# Patient Record
Sex: MALE | Race: BLACK OR AFRICAN AMERICAN | NOT HISPANIC OR LATINO | Employment: UNEMPLOYED | URBAN - METROPOLITAN AREA
[De-identification: names, ages, dates, MRNs, and addresses within clinical notes are randomized per-mention and may not be internally consistent; named-entity substitution may affect disease eponyms.]

---

## 2017-02-13 ENCOUNTER — HOSPITAL ENCOUNTER (OUTPATIENT)
Facility: OTHER | Age: 47
Discharge: HOME | End: 2017-02-15
Attending: EMERGENCY MEDICINE | Admitting: EMERGENCY MEDICINE
Payer: MEDICAID

## 2017-02-13 DIAGNOSIS — K92.2 GASTROINTESTINAL HEMORRHAGE, UNSPECIFIED GASTROINTESTINAL HEMORRHAGE TYPE: Primary | ICD-10-CM

## 2017-02-13 DIAGNOSIS — K92.1 MELENA: ICD-10-CM

## 2017-02-13 DIAGNOSIS — R53.1 WEAKNESS: ICD-10-CM

## 2017-02-13 PROBLEM — K92.0 HEMATEMESIS: Status: ACTIVE | Noted: 2017-02-13

## 2017-02-13 PROBLEM — I47.9 PAROXYSMAL TACHYCARDIA: Status: ACTIVE | Noted: 2017-02-13

## 2017-02-13 LAB
ABO + RH BLD: NORMAL
ALBUMIN SERPL BCP-MCNC: 4.8 G/DL
ALP SERPL-CCNC: 73 U/L
ALT SERPL W/O P-5'-P-CCNC: 26 U/L
ANION GAP SERPL CALC-SCNC: 20 MMOL/L
APTT BLDCRRT: 27.4 SEC
AST SERPL-CCNC: 47 U/L
BACTERIA #/AREA URNS HPF: ABNORMAL /HPF
BASOPHILS # BLD AUTO: 0.02 K/UL
BASOPHILS NFR BLD: 0.2 %
BILIRUB SERPL-MCNC: 0.7 MG/DL
BILIRUB UR QL STRIP: ABNORMAL
BLD GP AB SCN CELLS X3 SERPL QL: NORMAL
BUN SERPL-MCNC: 29 MG/DL
CALCIUM SERPL-MCNC: 10.2 MG/DL
CHLORIDE SERPL-SCNC: 97 MMOL/L
CLARITY UR: ABNORMAL
CO2 SERPL-SCNC: 19 MMOL/L
COLOR UR: YELLOW
CREAT SERPL-MCNC: 2 MG/DL
DIFFERENTIAL METHOD: ABNORMAL
EOSINOPHIL # BLD AUTO: 0 K/UL
EOSINOPHIL NFR BLD: 0.2 %
ERYTHROCYTE [DISTWIDTH] IN BLOOD BY AUTOMATED COUNT: 17.4 %
EST. GFR  (AFRICAN AMERICAN): 45 ML/MIN/1.73 M^2
EST. GFR  (NON AFRICAN AMERICAN): 39 ML/MIN/1.73 M^2
GLUCOSE SERPL-MCNC: 112 MG/DL
GLUCOSE UR QL STRIP: NEGATIVE
HCT VFR BLD AUTO: 37.8 %
HGB BLD-MCNC: 12.1 G/DL
HGB UR QL STRIP: ABNORMAL
HYALINE CASTS #/AREA URNS LPF: 29 /LPF
INR PPP: 0.9
KETONES UR QL STRIP: ABNORMAL
LACTATE SERPL-SCNC: 1.6 MMOL/L
LEUKOCYTE ESTERASE UR QL STRIP: ABNORMAL
LIPASE SERPL-CCNC: 62 U/L
LYMPHOCYTES # BLD AUTO: 2.4 K/UL
LYMPHOCYTES NFR BLD: 18.8 %
MCH RBC QN AUTO: 27 PG
MCHC RBC AUTO-ENTMCNC: 32 %
MCV RBC AUTO: 84 FL
MICROSCOPIC COMMENT: ABNORMAL
MONOCYTES # BLD AUTO: 0.6 K/UL
MONOCYTES NFR BLD: 4.8 %
NEUTROPHILS # BLD AUTO: 9.8 K/UL
NEUTROPHILS NFR BLD: 75.7 %
NITRITE UR QL STRIP: NEGATIVE
PH UR STRIP: 6 [PH] (ref 5–8)
PLATELET # BLD AUTO: 273 K/UL
PMV BLD AUTO: 9.3 FL
POTASSIUM SERPL-SCNC: 3.4 MMOL/L
PROT SERPL-MCNC: 9.3 G/DL
PROT UR QL STRIP: ABNORMAL
PROTHROMBIN TIME: 10.5 SEC
RBC # BLD AUTO: 4.48 M/UL
RBC #/AREA URNS HPF: 2 /HPF (ref 0–4)
SODIUM SERPL-SCNC: 136 MMOL/L
SP GR UR STRIP: 1.02 (ref 1–1.03)
SQUAMOUS #/AREA URNS HPF: 3 /HPF
TROPONIN I SERPL DL<=0.01 NG/ML-MCNC: 0.02 NG/ML
URN SPEC COLLECT METH UR: ABNORMAL
UROBILINOGEN UR STRIP-ACNC: 1 EU/DL
WBC # BLD AUTO: 12.95 K/UL
WBC #/AREA URNS HPF: 8 /HPF (ref 0–5)

## 2017-02-13 PROCEDURE — 81000 URINALYSIS NONAUTO W/SCOPE: CPT

## 2017-02-13 PROCEDURE — 99285 EMERGENCY DEPT VISIT HI MDM: CPT | Mod: 25

## 2017-02-13 PROCEDURE — 83690 ASSAY OF LIPASE: CPT

## 2017-02-13 PROCEDURE — G0378 HOSPITAL OBSERVATION PER HR: HCPCS

## 2017-02-13 PROCEDURE — 25000003 PHARM REV CODE 250: Performed by: EMERGENCY MEDICINE

## 2017-02-13 PROCEDURE — 85610 PROTHROMBIN TIME: CPT

## 2017-02-13 PROCEDURE — 96360 HYDRATION IV INFUSION INIT: CPT

## 2017-02-13 PROCEDURE — 93010 ELECTROCARDIOGRAM REPORT: CPT | Mod: ,,, | Performed by: INTERNAL MEDICINE

## 2017-02-13 PROCEDURE — 86900 BLOOD TYPING SEROLOGIC ABO: CPT

## 2017-02-13 PROCEDURE — 80053 COMPREHEN METABOLIC PANEL: CPT

## 2017-02-13 PROCEDURE — 83605 ASSAY OF LACTIC ACID: CPT

## 2017-02-13 PROCEDURE — 84484 ASSAY OF TROPONIN QUANT: CPT

## 2017-02-13 PROCEDURE — 85730 THROMBOPLASTIN TIME PARTIAL: CPT

## 2017-02-13 PROCEDURE — 93005 ELECTROCARDIOGRAM TRACING: CPT

## 2017-02-13 PROCEDURE — 86850 RBC ANTIBODY SCREEN: CPT

## 2017-02-13 PROCEDURE — 99220 PR INITIAL OBSERVATION CARE,LEVL III: CPT | Mod: ,,, | Performed by: HOSPITALIST

## 2017-02-13 PROCEDURE — 96361 HYDRATE IV INFUSION ADD-ON: CPT

## 2017-02-13 PROCEDURE — 85025 COMPLETE CBC W/AUTO DIFF WBC: CPT

## 2017-02-13 RX ORDER — SODIUM CHLORIDE 9 MG/ML
500 INJECTION, SOLUTION INTRAVENOUS
Status: COMPLETED | OUTPATIENT
Start: 2017-02-13 | End: 2017-02-13

## 2017-02-13 RX ORDER — SODIUM CHLORIDE 9 MG/ML
1000 INJECTION, SOLUTION INTRAVENOUS
Status: COMPLETED | OUTPATIENT
Start: 2017-02-13 | End: 2017-02-13

## 2017-02-13 RX ADMIN — SODIUM CHLORIDE 500 ML: 0.9 INJECTION, SOLUTION INTRAVENOUS at 08:02

## 2017-02-13 RX ADMIN — SODIUM CHLORIDE 1000 ML: 0.9 INJECTION, SOLUTION INTRAVENOUS at 10:02

## 2017-02-13 RX ADMIN — SODIUM CHLORIDE 500 ML: 0.9 INJECTION, SOLUTION INTRAVENOUS at 07:02

## 2017-02-13 NOTE — IP AVS SNAPSHOT
[facility] Location ([facility])  [address] [place] [id number]  Phone: [phone]           Patient Discharge Instructions     Our goal is to set you up for success. This packet includes information on your condition, medications, and your home care. It will help you to care for yourself so you don't get sicker and need to go back to the hospital.     Please ask your nurse if you have any questions.        There are many details to remember when preparing to leave the hospital. Here is what you will need to do:    1. Take your medicine. If you are prescribed medications, review your Medication List in the following pages. You may have new medications to  at the pharmacy and others that you'll need to stop taking. Review the instructions for how and when to take your medications. Talk with your doctor or nurses if you are unsure of what to do.     2. Go to your follow-up appointments. Specific follow-up information is listed in the following pages. Your may be contacted by a transition nurse or clinical provider about future appointments. Be sure we have all of the phone numbers to reach you, if needed. Please contact your provider's office if you are unable to make an appointment.     3. Watch for warning signs. Your doctor or nurse will give you detailed warning signs to watch for and when to call for assistance. These instructions may also include educational information about your condition. If you experience any of warning signs to your health, call your doctor.               ** Verify the list of medication(s) below is accurate and up to date. Carry this with you in case of emergency. If your medications have changed, please notify your healthcare provider.             Medication List      START taking these medications        Additional Info                      pantoprazole 40 MG tablet   Commonly known as:  PROTONIX   Quantity:  30 tablet   Refills:  0   Dose:  40 mg     Instructions:  Take 1 tablet (40 mg total) by mouth once daily.     Begin Date    AM    Noon    PM    Bedtime         STOP taking these medications     UNKNOWN TO PATIENT            Where to Get Your Medications      These medications were sent to [facility] [address] [address] [address] AT [facility]  [address], [facility] [address]    Hours:  24-hours Phone:  [phone]     pantoprazole 40 MG tablet                  Please bring to all follow up appointments:    1. A copy of your discharge instructions.  2. All medicines you are currently taking in their original bottles.  3. Identification and insurance card.    Please arrive 15 minutes ahead of scheduled appointment time.    Please call 24 hours in advance if you must reschedule your appointment and/or time.        Follow-up Information     Schedule an appointment as soon as possible for a visit with [facility].    Specialties:  Internal Medicine, Family Medicine    Contact information:    [address]  [facility] [address]  [phone]          Follow up On 3/24/2017.    Why:  @830 AM  MD APPT  WITH VIRY FISCHER        Follow up with Georgette Ty MD. Schedule an appointment as soon as possible for a visit in 2 weeks.    Specialty:  Gastroenterology    Contact information:    [address]  [address]  [facility] [address]  [phone]          Discharge Instructions     Future Orders    Activity as tolerated     Call MD for:  difficulty breathing or increased cough     Call MD for:  increased confusion or weakness     Call MD for:  persistent dizziness, light-headedness, or visual disturbances     Call MD for:  persistent nausea and vomiting or diarrhea     Call MD for:  severe persistent headache     Call MD for:  severe uncontrolled pain     Call MD for:  temperature >100.4     Call MD for:  worsening rash     Diet general     Questions:    Total  "calories:      Fat restriction, if any:      Protein restriction, if any:      Na restriction, if any:      Fluid restriction:      Additional restrictions:          Primary Diagnosis     Your primary diagnosis was:  Vomiting Blood      Admission Information     Date & Time Provider Department CSN    2/13/2017  6:08 PM Michael Pa MD [facility] [id number]      Care Providers     Provider Role Specialty Primary office phone    Michael Pa MD Attending Provider Hospitalist [phone]    Fredy Escobar MD Consulting Physician  Gastroenterology [phone]    Georgette Ty MD Surgeon  Gastroenterology [phone]      Your Vitals Were     BP Pulse Temp Resp Height Weight    138/89 73 98.2 °F (36.8 °C) 16 5' 7" (1.702 m) 63.5 kg (140 lb)    SpO2 BMI             100% 21.93 kg/m2         Recent Lab Values     No lab values to display.      Pending Labs     Order Current Status    Specimen to Pathology - Surgery Collected (02/15/17 0934)      Allergies as of 2/15/2017     No Known Allergies      [facility]     [facility] Nurse Care Line - 24/7 Assistance  Unless otherwise directed by your provider, please contact [facility], our nurse care line that is available for 24/7 assistance.     Registered nurses in the [facility] provide clinical advisement, health education, appointment booking, and other advisory services.  Call for this free service at [phone].        Advance Directives     An advance directive is a document which, in the event you are no longer able to make decisions for yourself, tells your healthcare team what kind of treatment you do or do not want to receive, or who you would like to make those decisions for you.  If you do not currently have an advance directive, [facility] encourages you to create one.  For more information call:  [phone]), [phone] ([phone]),  or log on to [email or web address].      "   Smoking Cessation     If you would like to quit smoking:   You may be eligible for free services if you are a [place] resident and started smoking cigarettes before September 1, 1988.  Call the Smoking Cessation Trust (SCT) toll free at (178) 854-8437 or (088) 375-8185.   Call 1-800-QUIT-NOW if you do not meet the above criteria.            Language Assistance Services     ATTENTION: Language assistance services are available, free of charge. Please call [phone].      ATENCIÓN: Si habla maurilio, tiene a smith disposición servicios gratuitos de asistencia lingüística. Llame al [phone].     CHÚ Ý: N?u b?n nói Ti?ng Vi?t, có các d?ch v? h? tr? ngôn ng? mi?n phí dành cho b?n. G?i s? [phone].        [facility] Sign-Up     Activating your [facility] account is as easy as 1-2-3!     1) Visit [email or web address], select Sign Up Now, enter this activation code and your date of birth, then select Next.  [email or web address]  Expires: 4/1/2017 12:20 PM      2) Create a username and password to use when you visit [facility] in the future and select a security question in case you lose your password and select Next.    3) Enter your e-mail address and click Sign Up!    Additional Information  If you have questions, please e-mail [email or web address] or call [phone] to talk to our [facility] staff. Remember, [facility] is NOT to be used for urgent needs. For medical emergencies, dial 911.          [facility] complies with applicable Federal civil rights laws and does not discriminate on the basis of race, color, national origin, age, disability, or sex.

## 2017-02-14 ENCOUNTER — ANESTHESIA EVENT (OUTPATIENT)
Dept: ENDOSCOPY | Facility: OTHER | Age: 47
End: 2017-02-14
Payer: MEDICAID

## 2017-02-14 ENCOUNTER — ANESTHESIA (OUTPATIENT)
Dept: ENDOSCOPY | Facility: OTHER | Age: 47
End: 2017-02-14
Payer: MEDICAID

## 2017-02-14 ENCOUNTER — SURGERY (OUTPATIENT)
Age: 47
End: 2017-02-14

## 2017-02-14 LAB
ALBUMIN SERPL BCP-MCNC: 3.6 G/DL
ALP SERPL-CCNC: 53 U/L
ALT SERPL W/O P-5'-P-CCNC: 18 U/L
ANION GAP SERPL CALC-SCNC: 19 MMOL/L
AST SERPL-CCNC: 33 U/L
BASOPHILS # BLD AUTO: 0.02 K/UL
BASOPHILS NFR BLD: 0.2 %
BILIRUB SERPL-MCNC: 1.1 MG/DL
BUN SERPL-MCNC: 20 MG/DL
CALCIUM SERPL-MCNC: 8.5 MG/DL
CHLORIDE SERPL-SCNC: 108 MMOL/L
CO2 SERPL-SCNC: 12 MMOL/L
CREAT SERPL-MCNC: 0.9 MG/DL
DIFFERENTIAL METHOD: ABNORMAL
EOSINOPHIL # BLD AUTO: 0.1 K/UL
EOSINOPHIL NFR BLD: 1.2 %
ERYTHROCYTE [DISTWIDTH] IN BLOOD BY AUTOMATED COUNT: 17.7 %
EST. GFR  (AFRICAN AMERICAN): >60 ML/MIN/1.73 M^2
EST. GFR  (NON AFRICAN AMERICAN): >60 ML/MIN/1.73 M^2
GLUCOSE SERPL-MCNC: 85 MG/DL
HCT VFR BLD AUTO: 28.8 %
HCT VFR BLD AUTO: 29 %
HCT VFR BLD AUTO: 29.9 %
HGB BLD-MCNC: 9 G/DL
HGB BLD-MCNC: 9.2 G/DL
HGB BLD-MCNC: 9.5 G/DL
LYMPHOCYTES # BLD AUTO: 2 K/UL
LYMPHOCYTES NFR BLD: 22.7 %
MCH RBC QN AUTO: 26.7 PG
MCHC RBC AUTO-ENTMCNC: 31.7 %
MCV RBC AUTO: 84 FL
MONOCYTES # BLD AUTO: 0.6 K/UL
MONOCYTES NFR BLD: 7.4 %
NEUTROPHILS # BLD AUTO: 5.9 K/UL
NEUTROPHILS NFR BLD: 68.2 %
PLATELET # BLD AUTO: 210 K/UL
PMV BLD AUTO: 9.4 FL
POTASSIUM SERPL-SCNC: 3 MMOL/L
PROT SERPL-MCNC: 6.6 G/DL
RBC # BLD AUTO: 3.44 M/UL
SODIUM SERPL-SCNC: 139 MMOL/L
WBC # BLD AUTO: 8.6 K/UL

## 2017-02-14 PROCEDURE — 25000003 PHARM REV CODE 250: Performed by: INTERNAL MEDICINE

## 2017-02-14 PROCEDURE — 80053 COMPREHEN METABOLIC PANEL: CPT

## 2017-02-14 PROCEDURE — 63600175 PHARM REV CODE 636 W HCPCS: Performed by: INTERNAL MEDICINE

## 2017-02-14 PROCEDURE — G0378 HOSPITAL OBSERVATION PER HR: HCPCS

## 2017-02-14 PROCEDURE — C9113 INJ PANTOPRAZOLE SODIUM, VIA: HCPCS | Performed by: INTERNAL MEDICINE

## 2017-02-14 PROCEDURE — 85014 HEMATOCRIT: CPT | Mod: 91

## 2017-02-14 PROCEDURE — 36415 COLL VENOUS BLD VENIPUNCTURE: CPT

## 2017-02-14 PROCEDURE — 85025 COMPLETE CBC W/AUTO DIFF WBC: CPT

## 2017-02-14 PROCEDURE — 85018 HEMOGLOBIN: CPT

## 2017-02-14 RX ORDER — IBUPROFEN 200 MG
24 TABLET ORAL
Status: DISCONTINUED | OUTPATIENT
Start: 2017-02-14 | End: 2017-02-15 | Stop reason: HOSPADM

## 2017-02-14 RX ORDER — PANTOPRAZOLE SODIUM 40 MG/10ML
80 INJECTION, POWDER, LYOPHILIZED, FOR SOLUTION INTRAVENOUS ONCE
Status: COMPLETED | OUTPATIENT
Start: 2017-02-14 | End: 2017-02-14

## 2017-02-14 RX ORDER — GLUCAGON 1 MG
1 KIT INJECTION
Status: DISCONTINUED | OUTPATIENT
Start: 2017-02-14 | End: 2017-02-15 | Stop reason: HOSPADM

## 2017-02-14 RX ORDER — PANTOPRAZOLE SODIUM 40 MG/10ML
40 INJECTION, POWDER, LYOPHILIZED, FOR SOLUTION INTRAVENOUS 2 TIMES DAILY
Status: DISCONTINUED | OUTPATIENT
Start: 2017-02-14 | End: 2017-02-15 | Stop reason: HOSPADM

## 2017-02-14 RX ORDER — SODIUM CHLORIDE 9 MG/ML
INJECTION, SOLUTION INTRAVENOUS CONTINUOUS
Status: DISCONTINUED | OUTPATIENT
Start: 2017-02-14 | End: 2017-02-15 | Stop reason: HOSPADM

## 2017-02-14 RX ORDER — IBUPROFEN 200 MG
16 TABLET ORAL
Status: DISCONTINUED | OUTPATIENT
Start: 2017-02-14 | End: 2017-02-15 | Stop reason: HOSPADM

## 2017-02-14 RX ORDER — ONDANSETRON 2 MG/ML
4 INJECTION INTRAMUSCULAR; INTRAVENOUS EVERY 12 HOURS PRN
Status: DISCONTINUED | OUTPATIENT
Start: 2017-02-14 | End: 2017-02-15 | Stop reason: HOSPADM

## 2017-02-14 RX ADMIN — SODIUM CHLORIDE: 0.9 INJECTION, SOLUTION INTRAVENOUS at 09:02

## 2017-02-14 RX ADMIN — PANTOPRAZOLE SODIUM 40 MG: 40 INJECTION, POWDER, FOR SOLUTION INTRAVENOUS at 08:02

## 2017-02-14 RX ADMIN — SODIUM CHLORIDE: 0.9 INJECTION, SOLUTION INTRAVENOUS at 01:02

## 2017-02-14 RX ADMIN — SODIUM CHLORIDE: 0.9 INJECTION, SOLUTION INTRAVENOUS at 08:02

## 2017-02-14 RX ADMIN — PANTOPRAZOLE SODIUM 40 MG: 40 INJECTION, POWDER, FOR SOLUTION INTRAVENOUS at 09:02

## 2017-02-14 RX ADMIN — PANTOPRAZOLE SODIUM 80 MG: 40 INJECTION, POWDER, FOR SOLUTION INTRAVENOUS at 01:02

## 2017-02-14 NOTE — ANESTHESIA PREPROCEDURE EVALUATION
02/14/2017  Robert Mcfarland is a 46 y.o., male.    [facility] Anesthesia Evaluation    I have reviewed the Patient Summary Reports.    I have reviewed the Nursing Notes.   I have reviewed the Medications.     Review of Systems  Anesthesia Hx:  Denies Family Hx of Anesthesia complications.   Denies Personal Hx of Anesthesia complications.   Social:  Alcohol Use, Smoker    Hematology/Oncology:  Hematology Normal   Oncology Normal     EENT/Dental:EENT/Dental Normal   Cardiovascular:   Hypertension ECG has been reviewed. EKG: sinus tach   Pulmonary:  Pulmonary Normal    Renal/:  Renal/ Normal     Hepatic/GI:  Hepatic/GI Normal Pt admitted yesterday with probable UGI bleed, melena, single episode coffee ground emesis, also yesterday. In ED, volume depleted, Hct following fluid resuscitation 29. Initially, ARF, now corrected. K 3.0 today.   Musculoskeletal:  Musculoskeletal Normal    Neurological:  Neurology Normal    Endocrine:  Endocrine Normal    Dermatological:  Skin Normal    Psych:   ETOH dependence         Physical Exam  General:  Well nourished    Airway/Jaw/Neck:  Airway Findings: Mouth Opening: Normal Tongue: Normal  General Airway Assessment: Adult  Mallampati: II  TM Distance: Normal, at least 6 cm      Dental:  Dental Findings: Periodontal disease, Severe, upper front caps         Mental Status:  Mental Status Findings:  Alert and Oriented, Cooperative         Anesthesia Plan  Type of Anesthesia, risks & benefits discussed:  Anesthesia Type:  general  Patient's Preference:   Intra-op Monitoring Plan:   Intra-op Monitoring Plan Comments:   Post Op Pain Control Plan:   Post Op Pain Control Plan Comments:   Induction:   IV  Beta Blocker:         Informed Consent: Patient understands risks and agrees with Anesthesia plan.  Questions answered. Anesthesia consent signed with patient.  ASA Score: 2     Day of  Surgery Review of History & Physical:    H&P update referred to the surgeon.         Ready For Surgery From Anesthesia Perspective.

## 2017-02-14 NOTE — ED NOTES
Patient resting quietly on stretcher in NAD, with family at bedside. IV infusing and patient denies pain/complaints at this time. Pt on cardiac monitor, NIBP, and pulse ox. Will continue to monitor.

## 2017-02-14 NOTE — ASSESSMENT & PLAN NOTE
- suspect chronic UGIB secondary to likely PUD or erosive gastropathy.    - history of frequent NSAID use  - no history of cirrhosis  - IVF  - will monitor H/H  - 80 mg Protonix IV bolus  - 40 mg Protonix IV BID starting tomorrow  - NPO  - GI consult for EGD

## 2017-02-14 NOTE — PLAN OF CARE
Attn: team   DC PLANNING     PT NEEDS PCP - RN GURINDER  MADE F/U APPT @ [facility] ( PT CHOICE )     OUTPT COMMUNITY REFERRALS GIVEN ON COMMUNITY CLINICS AND  FREE PHARMACY ALSO ON OUT ALCOHOL REHAB     Avril Mejía RN  Case management 2/14/201712:44 PM  # [phone] (FAX) [phone]     02/14/17 1306   Discharge Assessment   Assessment Type Discharge Planning Assessment   Confirmed/corrected address and phone number on facesheet? Yes   Assessment information obtained from? Patient   Prior to hospitilization cognitive status: Alert/Oriented   Prior to hospitalization functional status: Independent   Current cognitive status: Alert/Oriented   Current Functional Status: Independent   Able to Return to Prior Arrangements yes   Is patient able to care for self after discharge? Yes   Patient currently being followed by outpatient case management? No   Patient currently receives home health services? No   Does the patient currently use HME? No   Patient currently receives private duty nursing? N/A   Patient currently receives any other outside agency services? No   Equipment Currently Used at Home none   Do you have any problems affording any of your prescribed medications? TBD   Is the patient taking medications as prescribed? yes   Do you have any financial concerns preventing you from receiving the healthcare you need? No   Does the patient have transportation to healthcare appointments? No   On Dialysis? No   Does the patient receive services at the Coumadin Clinic? No   Are there any open cases? No   Discharge Plan A Home with family   Discharge Plan B Home with family   Patient/Family In Agreement With Plan yes

## 2017-02-14 NOTE — NURSING
Bed alarm alarming; pt noted by staff that pt was noted with cake in his hand; staff noted piece of cake was missing. Pt due for a EGD; spoke with Dr. Ty was notified; Dr. Pa paged. Pt was instructed upon assessment to remain NPO.   1001: new orders per Dr. Pa: clear liquids and NPO after MN.

## 2017-02-14 NOTE — ED PROVIDER NOTES
"Encounter Date: 2/13/2017    SCRIBE #1 NOTE: I, Kay Mian, am scribing for, and in the presence of, Dr. House.       History     Chief Complaint   Patient presents with    Weakness     x 1week, states vomited blood yesterday      Review of patient's allergies indicates:  No Known Allergies  HPI Comments: Time seen by provider: 7:00 PM    This is a 46 y.o. male who presents with complaint of weakness that has persisted for approximately 1 week.  The patient also endorses diaphoresis, as well as 1 episode of hematemesis which occurred yesterday.  He describes the blood as "dark red" and "black" in color.  He has also had dark stools for the past several weeks now.  He mentions that he had abdominal pain, which he describes as a "cramping," 2 days ago, which has since resolved.  He reports no fever, chills, congestion, sore throat, CP, dysuria, changes in urinary frequency or urgency, difficulty urinating, or hematuria.  The patient admits that he takes approximately 2 ibuprofen daily.  He reports no major medical problems or daily medications.  He mentions that he drinks approximately 2-3 beers daily.    The history is provided by the patient.     Past Medical History   Diagnosis Date    Alcohol abuse      No past medical history pertinent negatives.  No past surgical history on file.  No family history on file.  Social History   Substance Use Topics    Smoking status: Current Every Day Smoker     Types: Cigarettes    Smokeless tobacco: Not on file    Alcohol use Yes     Review of Systems   Constitutional: Negative for chills and fever.   HENT: Negative for congestion and facial swelling.    Respiratory: Negative for chest tightness and shortness of breath.    Cardiovascular: Negative for chest pain.   Gastrointestinal: Positive for blood in stool and vomiting. Abdominal pain: Resolved.        Positive for hematemesis.   Endocrine: Negative for polyuria.   Genitourinary: Negative for difficulty urinating, " dysuria, frequency, hematuria and urgency.   Musculoskeletal: Negative for myalgias.   Skin: Negative for rash.   Neurological: Positive for weakness. Negative for headaches.       Physical Exam   Initial Vitals   BP Pulse Resp Temp SpO2   02/13/17 1813 02/13/17 1813 02/13/17 1813 02/13/17 1813 02/13/17 1813   110/83 127 16 98.9 °F (37.2 °C) 100 %     Physical Exam    Nursing note and vitals reviewed.  Constitutional: He appears well-developed and well-nourished. He is not diaphoretic. No distress.   HENT:   Head: Normocephalic and atraumatic.   Right Ear: External ear normal.   Left Ear: External ear normal.   Dry mucous membranes   Eyes: EOM are normal. Right eye exhibits no discharge. Left eye exhibits no discharge.   Neck: Normal range of motion. Neck supple.   Cardiovascular: Regular rhythm and normal heart sounds.   Tachycardic   Pulmonary/Chest: Breath sounds normal. No respiratory distress. He has no wheezes. He has no rhonchi. He has no rales.   Abdominal: Soft. Bowel sounds are normal. He exhibits no distension. There is no tenderness. There is no rebound and no guarding.   Genitourinary: Rectal exam shows external hemorrhoid and guaiac positive stool. Rectal exam shows anal tone normal. Guaiac positive stool. : Acceptable.  Genitourinary Comments: Enlarged, non-thrombosed external hemorrhoid.  Faint gross blood and guaiac positive.  Good rectal tone.     Musculoskeletal: Normal range of motion. He exhibits no edema or tenderness.   Neurological: He is alert and oriented to person, place, and time.   Skin: Skin is warm and dry. No rash and no abscess noted. No erythema. No pallor.   Psychiatric: He has a normal mood and affect. His behavior is normal. Judgment and thought content normal.         ED Course   Procedures  Labs Reviewed   COMPREHENSIVE METABOLIC PANEL - Abnormal; Notable for the following:        Result Value    Potassium 3.4 (*)     CO2 19 (*)     Glucose 112 (*)     BUN, Bld  29 (*)     Creatinine 2.0 (*)     Total Protein 9.3 (*)     AST 47 (*)     Anion Gap 20 (*)     eGFR if  45 (*)     eGFR if non  39 (*)     All other components within normal limits   CBC W/ AUTO DIFFERENTIAL - Abnormal; Notable for the following:     WBC 12.95 (*)     RBC 4.48 (*)     Hemoglobin 12.1 (*)     Hematocrit 37.8 (*)     RDW 17.4 (*)     Gran # 9.8 (*)     Gran% 75.7 (*)     All other components within normal limits   URINALYSIS - Abnormal; Notable for the following:     Appearance, UA Hazy (*)     Protein, UA 1+ (*)     Ketones, UA 3+ (*)     Bilirubin (UA) 2+ (*)     Occult Blood UA Trace (*)     Leukocytes, UA Trace (*)     All other components within normal limits   LIPASE - Abnormal; Notable for the following:     Lipase 62 (*)     All other components within normal limits   URINALYSIS MICROSCOPIC - Abnormal; Notable for the following:     WBC, UA 8 (*)     Hyaline Casts, UA 29 (*)     All other components within normal limits   LACTIC ACID, PLASMA   TROPONIN I   APTT   PROTIME-INR   TYPE & SCREEN      Imaging Results         X-Ray Chest 1 View (Final result) Result time:  02/13/17 19:48:57    Final result by Vern Brian MD (02/13/17 19:48:57)    Impression:        No radiographic acute intrathoracic process seen on this single view.      Electronically signed by: VERN BRIAN MD, MD  Date:     02/13/17  Time:    19:48     Narrative:    COMPARISON: None    FINDINGS: AP portable view of the chest. Monitoring leads overlie the chest. Pulmonary vasculature and hilar regions are within normal limits. The bilateral lungs are well expanded and clear.  No large pleural effusion or pneumothorax.  The heart and mediastinal contours are within normal limits for age.  Osseous structures appear intact.              EKG Readings: (Independently Interpreted)   Initial Reading: No STEMI.   19:25. Rate of 114. Sinus tachycardia. Normal axis. Normal intervals. No ST or ischemic  changes.         X-Rays:   Independently Interpreted Readings:   Chest X-Ray: X-Ray Chest 1 View: Trachea midline. No cardiomegaly. No effusion, infiltrate, or edema.       Medical Decision Making:   History:   Old Medical Records: I decided to obtain old medical records.  Old Records Summarized: records from previous admission(s) and other records.  Initial Assessment:   7:00PM:  Pt is a 47 y/o M who presents to ED with generalized weakness and GIB.  Pt does admit to NSAID and ETOH use.  He is tachycardic though his BP is stable. Will plan for labs, IVFs, will continue to follow and reassess.    Independently Interpreted Test(s):   I have ordered and independently interpreted EKG Reading(s) - see prior notes  Clinical Tests:   Lab Tests: Ordered and Reviewed  Radiological Study: Ordered and Reviewed  Medical Tests: Ordered and Reviewed    10:16 PM:  Pt doing well, he remains stable, though still tachycardic after 1L.  Will plan for an additional liter.  He does have a Cr of 2.0 with no previous baseline known.  His Hgb is 12, though he may have some level of hemoconcentration as well. Given his persistent tachycardic and GIB, will plan to admit for further observation and management.  I updated pt regarding results and all questions answered. Pt agreeable to plan.      10:19 PM:  I spoke with Dr. Avila regarding pt, he is agreeable to plan for admission and all questions answered.              Scribe Attestation:   Scribe #1: I performed the above scribed service and the documentation accurately describes the services I performed. I attest to the accuracy of the note.    Attending Attestation:           Physician Attestation for Scribe:  Physician Attestation Statement for Scribe #1: I, Dr. House, reviewed documentation, as scribed by Kay Pappas in my presence, and it is both accurate and complete.                 ED Course     Clinical Impression:     1. Gastrointestinal hemorrhage, unspecified gastrointestinal  hemorrhage type    2. Weakness                Nereida House MD  02/13/17 0744

## 2017-02-14 NOTE — H&P
[facility] Hospital Medicine  History & Physical    Patient Name: Robert Mcfarland  MRN: [id number]  Admission Date: 2/13/2017  Attending Physician: Nereida House MD   Primary Care Provider: Primary Doctor No         Patient information was obtained from patient and ER records.     Subjective:     Principal Problem:<principal problem not specified>    Chief Complaint:   Chief Complaint   Patient presents with    Weakness     x 1week, states vomited blood yesterday         HPI: This is a 46 year old male with no significant past medical history who presents with one week of dark stools associated with progressive weakness and occasional shortness of breath.  He states that yesterday he also had one episode of coffee ground emesis.  All of this prompted him to come to the ED where he was found to be tachycardic, clinically volume deplete, and with an H/H of 12/37.  He endorses one year of NSAID use, taking 800 mg of Ibuprofen at least every other day.  He also endorses ETOH use around three times a week, but denies any history of liver disease.  Otherwise, he has no other complaints today.    Past Medical History   Diagnosis Date    Alcohol abuse        No past surgical history on file.    Review of patient's allergies indicates:  No Known Allergies    No current facility-administered medications on file prior to encounter.      No current outpatient prescriptions on file prior to encounter.     Family History     None        Social History Main Topics    Smoking status: Current Every Day Smoker     Types: Cigarettes    Smokeless tobacco: Not on file    Alcohol use Yes    Drug use: No    Sexual activity: Not on file     Review of Systems   Constitutional: Positive for fatigue. Negative for activity change, appetite change and fever.   HENT: Negative.    Eyes: Positive for redness.   Respiratory: Negative for chest tightness, shortness of breath and wheezing.    Cardiovascular: Negative for chest  pain, palpitations and leg swelling.   Gastrointestinal: Positive for blood in stool and vomiting. Negative for abdominal distention, abdominal pain and diarrhea.   Genitourinary: Negative for dysuria and hematuria.   Neurological: Negative for headaches.   Hematological: Negative for adenopathy.   Psychiatric/Behavioral: Negative for confusion.     Objective:     Vital Signs (Most Recent):  Temp: 98.9 °F (37.2 °C) (02/13/17 1813)  Pulse: (!) 114 (02/13/17 2036)  Resp: 16 (02/13/17 1813)  BP: (!) 140/90 (02/13/17 2036)  SpO2: 100 % (02/13/17 2036) Vital Signs (24h Range):  Temp:  [98.9 °F (37.2 °C)] 98.9 °F (37.2 °C)  Pulse:  [114-127] 114  Resp:  [16] 16  SpO2:  [100 %] 100 %  BP: (110-140)/(83-90) 140/90     Weight: 63.5 kg (140 lb)  Body mass index is 21.93 kg/(m^2).    Physical Exam   Constitutional: He is oriented to person, place, and time. He appears well-developed and well-nourished. No distress.   HENT:   Head: Normocephalic and atraumatic.   Eyes: Pupils are equal, round, and reactive to light.   Neck: Neck supple. No thyromegaly present.   Cardiovascular: Normal rate.  Exam reveals no gallop and no friction rub.    No murmur heard.  tachycardic   Pulmonary/Chest: Effort normal and breath sounds normal. No respiratory distress. He has no wheezes.   Abdominal: Soft. Bowel sounds are normal. He exhibits no distension. There is no tenderness. There is no guarding.   Musculoskeletal: Normal range of motion. He exhibits no edema.   Neurological: He is alert and oriented to person, place, and time.   Skin: Skin is warm and dry. No erythema.   Psychiatric: He has a normal mood and affect.        Significant Labs:   CBC:   Recent Labs  Lab 02/13/17 1917   WBC 12.95*   HGB 12.1*   HCT 37.8*          Significant Imaging: I have reviewed and interpreted all pertinent imaging results/findings within the past 24 hours.    Assessment/Plan:     Melena  - suspect chronic UGIB secondary to likely PUD or erosive  gastropathy.    - history of frequent NSAID use  - no history of cirrhosis  - IVF  - will monitor H/H  - 80 mg Protonix IV bolus  - 40 mg Protonix IV BID starting tomorrow  - NPO  - GI consult for EGD      Hematemesis  - as above  - no risk factors for variceal bleed      Paroxysmal tachycardia  - secondary to volume depletion  - monitor after volume given    VTE prophylaxis: SCD's    Vega Avila MD  Department of Hospital Medicine   [facility]

## 2017-02-14 NOTE — NURSING
Pt resting quietly in bed with family at bedside. Denies pain. C/o SOB with activity. VSS, 100% on RA. NS @ 125 infusing to LAC PIV per order. NPO status maintained.Repositions in bed independently. Urinal at bedside. Instructed to call for assistance as needed. Purposeful rounding done. Safety maintained. No acute distress noted at this time. Will continue to monitor.

## 2017-02-14 NOTE — NURSING
Security called multiple times due to pt significant other noted yelling and using profanity. Pt in NAD. Pt updated on plan of care.

## 2017-02-14 NOTE — CONSULTS
Consult Note  Gastroenterology    Consult Requested By: Dr Pa  Reason for Consult: n/v    SUBJECTIVE:     History of Present Illness:  Patient is a 46 y.o. male who presents with n/v associated with blood. Onset of symptoms was abrupt starting 2 day ago with stable course since that time. Patient denies constipation, diarrhea and dysphagia. Symptoms are aggravated by none. Symptoms improve with none. Past history includes ETOH use. Pt denies any prior h/o pud or hepatitis    Review of patient's allergies indicates:  No Known Allergies    Scheduled Meds:   pantoprazole  40 mg Intravenous BID       Continuous Infusions:   sodium chloride 0.9% 125 mL/hr at 02/14/17 0845       PRN Meds:.  dextrose 50%, dextrose 50%, glucagon (human recombinant), glucose, glucose, ondansetron, promethazine (PHENERGAN) IVPB    Past Medical History   Diagnosis Date    Alcohol abuse     Hypertension        History reviewed. No pertinent past surgical history.    History reviewed. No pertinent family history.    Social History     Social History    Marital status: Single     Spouse name: N/A    Number of children: N/A    Years of education: N/A     Social History Main Topics    Smoking status: Current Every Day Smoker     Types: Cigarettes    Smokeless tobacco: None    Alcohol use Yes    Drug use: No    Sexual activity: Not Asked     Other Topics Concern    None     Social History Narrative       Review of Systems:  Constitutional: no fever or chills  Eyes: no visual changes  ENT: no nasal congestion or sore throat  Respiratory: no cough or shorness of breath  Cardiovascular: no chest pain or palpitations  Gastrointestinal: no nausea or vomiting, no abdominal pain or change in bowel habits  Genitourinary: no hematuria or dysuria    OBJECTIVE:   Physical Exam:  General: Well developed, well nourished, no apparent distress  Vital Signs Range (Last 24H):  Temp:  [98.1 °F (36.7 °C)-98.9 °F (37.2 °C)]   Pulse:  [102-127]    Resp:  [16-18]   BP: (110-144)/(65-97)   SpO2:  [100 %]   HEENT: Anicteric, PERRLA  CVS: S1, S2, no murmers/rubs  Lungs: CTA-B, normal excursion  Abdomen: Soft, NT, ND, normal BS's  Extremities: No c/c/e, 1+ DP pulses to BLE's  Skin: No rashes/lesions.      Laboratory:    CBC:   Recent Labs  Lab 02/14/17  0504   WBC 8.60   RBC 3.44*   HGB 9.2*   HCT 29.0*      MCV 84   MCH 26.7*   MCHC 31.7*     CMP:   Recent Labs  Lab 02/14/17  0504   GLU 85   CALCIUM 8.5*   ALBUMIN 3.6   PROT 6.6      K 3.0*   CO2 12*      BUN 20   CREATININE 0.9   ALKPHOS 53*   ALT 18   AST 33   BILITOT 1.1*     Coagulation:   Recent Labs  Lab 02/13/17 1917   INR 0.9   APTT 27.4       Recent Results (from the past 336 hour(s))   CBC with Automated Differential    Collection Time: 02/14/17  5:04 AM   Result Value Ref Range    WBC 8.60 3.90 - 12.70 K/uL    Hemoglobin 9.2 (L) 14.0 - 18.0 g/dL    Hematocrit 29.0 (L) 40.0 - 54.0 %    Platelets 210 150 - 350 K/uL   CBC auto differential    Collection Time: 02/13/17  7:17 PM   Result Value Ref Range    WBC 12.95 (H) 3.90 - 12.70 K/uL    Hemoglobin 12.1 (L) 14.0 - 18.0 g/dL    Hematocrit 37.8 (L) 40.0 - 54.0 %    Platelets 273 150 - 350 K/uL        Recent Labs  Lab 02/13/17 1917   APTT 27.4   INR 0.9       Recent Labs  Lab 02/13/17 1917 02/14/17  0504    139   K 3.4* 3.0*   CL 97 108   CO2 19* 12*   BUN 29* 20   CREATININE 2.0* 0.9   CALCIUM 10.2 8.5*   PROT 9.3* 6.6   BILITOT 0.7 1.1*   ALKPHOS 73 53*   ALT 26 18   AST 47* 33    No results found for: HAV, HEPAIGM, HEPBIGM, HEPBCAB, HBEAG, HEPCAB No results found for: AFP   No results found for: CEA       Diagnostic Results:  No Further    ASSESSMENT/PLAN:     1. Gastrointestinal hemorrhage, unspecified gastrointestinal hemorrhage type    2. Weakness        Plan: 1) PPI BID  2) serial h/h  3) EGD

## 2017-02-14 NOTE — SUBJECTIVE & OBJECTIVE
Past Medical History   Diagnosis Date    Alcohol abuse        No past surgical history on file.    Review of patient's allergies indicates:  No Known Allergies    No current facility-administered medications on file prior to encounter.      No current outpatient prescriptions on file prior to encounter.     Family History     None        Social History Main Topics    Smoking status: Current Every Day Smoker     Types: Cigarettes    Smokeless tobacco: Not on file    Alcohol use Yes    Drug use: No    Sexual activity: Not on file     Review of Systems   Constitutional: Positive for fatigue. Negative for activity change, appetite change and fever.   HENT: Negative.    Eyes: Positive for redness.   Respiratory: Negative for chest tightness, shortness of breath and wheezing.    Cardiovascular: Negative for chest pain, palpitations and leg swelling.   Gastrointestinal: Positive for blood in stool and vomiting. Negative for abdominal distention, abdominal pain and diarrhea.   Genitourinary: Negative for dysuria and hematuria.   Neurological: Negative for headaches.   Hematological: Negative for adenopathy.   Psychiatric/Behavioral: Negative for confusion.     Objective:     Vital Signs (Most Recent):  Temp: 98.9 °F (37.2 °C) (02/13/17 1813)  Pulse: (!) 114 (02/13/17 2036)  Resp: 16 (02/13/17 1813)  BP: (!) 140/90 (02/13/17 2036)  SpO2: 100 % (02/13/17 2036) Vital Signs (24h Range):  Temp:  [98.9 °F (37.2 °C)] 98.9 °F (37.2 °C)  Pulse:  [114-127] 114  Resp:  [16] 16  SpO2:  [100 %] 100 %  BP: (110-140)/(83-90) 140/90     Weight: 63.5 kg (140 lb)  Body mass index is 21.93 kg/(m^2).    Physical Exam   Constitutional: He is oriented to person, place, and time. He appears well-developed and well-nourished. No distress.   HENT:   Head: Normocephalic and atraumatic.   Eyes: Pupils are equal, round, and reactive to light.   Neck: Neck supple. No thyromegaly present.   Cardiovascular: Normal rate.  Exam reveals no gallop and  no friction rub.    No murmur heard.  tachycardic   Pulmonary/Chest: Effort normal and breath sounds normal. No respiratory distress. He has no wheezes.   Abdominal: Soft. Bowel sounds are normal. He exhibits no distension. There is no tenderness. There is no guarding.   Musculoskeletal: Normal range of motion. He exhibits no edema.   Neurological: He is alert and oriented to person, place, and time.   Skin: Skin is warm and dry. No erythema.   Psychiatric: He has a normal mood and affect.        Significant Labs:   CBC:   Recent Labs  Lab 02/13/17 1917   WBC 12.95*   HGB 12.1*   HCT 37.8*          Significant Imaging: I have reviewed and interpreted all pertinent imaging results/findings within the past 24 hours.

## 2017-02-15 ENCOUNTER — SURGERY (OUTPATIENT)
Age: 47
End: 2017-02-15

## 2017-02-15 VITALS
DIASTOLIC BLOOD PRESSURE: 89 MMHG | RESPIRATION RATE: 16 BRPM | HEIGHT: 67 IN | TEMPERATURE: 98 F | OXYGEN SATURATION: 100 % | BODY MASS INDEX: 21.97 KG/M2 | HEART RATE: 73 BPM | SYSTOLIC BLOOD PRESSURE: 138 MMHG | WEIGHT: 140 LBS

## 2017-02-15 LAB
ANION GAP SERPL CALC-SCNC: 13 MMOL/L
BASOPHILS # BLD AUTO: 0.02 K/UL
BASOPHILS NFR BLD: 0.3 %
BUN SERPL-MCNC: 8 MG/DL
CALCIUM SERPL-MCNC: 9 MG/DL
CHLORIDE SERPL-SCNC: 107 MMOL/L
CO2 SERPL-SCNC: 19 MMOL/L
CREAT SERPL-MCNC: 0.8 MG/DL
DIFFERENTIAL METHOD: ABNORMAL
EOSINOPHIL # BLD AUTO: 0.1 K/UL
EOSINOPHIL NFR BLD: 0.8 %
ERYTHROCYTE [DISTWIDTH] IN BLOOD BY AUTOMATED COUNT: 18 %
EST. GFR  (AFRICAN AMERICAN): >60 ML/MIN/1.73 M^2
EST. GFR  (NON AFRICAN AMERICAN): >60 ML/MIN/1.73 M^2
GLUCOSE SERPL-MCNC: 70 MG/DL
HCT VFR BLD AUTO: 28.2 %
HCT VFR BLD AUTO: 29.4 %
HGB BLD-MCNC: 8.9 G/DL
HGB BLD-MCNC: 9.2 G/DL
LYMPHOCYTES # BLD AUTO: 2.5 K/UL
LYMPHOCYTES NFR BLD: 31.6 %
MAGNESIUM SERPL-MCNC: 1.8 MG/DL
MCH RBC QN AUTO: 27.1 PG
MCHC RBC AUTO-ENTMCNC: 31.6 %
MCV RBC AUTO: 86 FL
MONOCYTES # BLD AUTO: 0.4 K/UL
MONOCYTES NFR BLD: 5.4 %
NEUTROPHILS # BLD AUTO: 4.9 K/UL
NEUTROPHILS NFR BLD: 61.6 %
PHOSPHATE SERPL-MCNC: 2.2 MG/DL
PLATELET # BLD AUTO: 218 K/UL
PMV BLD AUTO: 9.5 FL
POTASSIUM SERPL-SCNC: 3.3 MMOL/L
RBC # BLD AUTO: 3.28 M/UL
SODIUM SERPL-SCNC: 139 MMOL/L
WBC # BLD AUTO: 7.9 K/UL

## 2017-02-15 PROCEDURE — 83735 ASSAY OF MAGNESIUM: CPT

## 2017-02-15 PROCEDURE — 85018 HEMOGLOBIN: CPT

## 2017-02-15 PROCEDURE — 37000009 HC ANESTHESIA EA ADD 15 MINS: Performed by: INTERNAL MEDICINE

## 2017-02-15 PROCEDURE — 43239 EGD BIOPSY SINGLE/MULTIPLE: CPT | Performed by: INTERNAL MEDICINE

## 2017-02-15 PROCEDURE — 99225 PR SUBSEQUENT OBSERVATION CARE,LEVEL II: CPT | Mod: ,,, | Performed by: HOSPITALIST

## 2017-02-15 PROCEDURE — 88305 TISSUE EXAM BY PATHOLOGIST: CPT | Performed by: PATHOLOGY

## 2017-02-15 PROCEDURE — 25000003 PHARM REV CODE 250: Performed by: NURSE ANESTHETIST, CERTIFIED REGISTERED

## 2017-02-15 PROCEDURE — 84100 ASSAY OF PHOSPHORUS: CPT

## 2017-02-15 PROCEDURE — 36415 COLL VENOUS BLD VENIPUNCTURE: CPT

## 2017-02-15 PROCEDURE — 85014 HEMATOCRIT: CPT

## 2017-02-15 PROCEDURE — 80048 BASIC METABOLIC PNL TOTAL CA: CPT

## 2017-02-15 PROCEDURE — 37000008 HC ANESTHESIA 1ST 15 MINUTES: Performed by: INTERNAL MEDICINE

## 2017-02-15 PROCEDURE — 88305 TISSUE EXAM BY PATHOLOGIST: CPT | Mod: 26,,, | Performed by: PATHOLOGY

## 2017-02-15 PROCEDURE — 63600175 PHARM REV CODE 636 W HCPCS: Performed by: INTERNAL MEDICINE

## 2017-02-15 PROCEDURE — G0378 HOSPITAL OBSERVATION PER HR: HCPCS

## 2017-02-15 PROCEDURE — C9113 INJ PANTOPRAZOLE SODIUM, VIA: HCPCS | Performed by: INTERNAL MEDICINE

## 2017-02-15 PROCEDURE — 63600175 PHARM REV CODE 636 W HCPCS: Performed by: NURSE ANESTHETIST, CERTIFIED REGISTERED

## 2017-02-15 PROCEDURE — 85025 COMPLETE CBC W/AUTO DIFF WBC: CPT

## 2017-02-15 RX ORDER — HYDROMORPHONE HYDROCHLORIDE 2 MG/ML
0.4 INJECTION, SOLUTION INTRAMUSCULAR; INTRAVENOUS; SUBCUTANEOUS EVERY 5 MIN PRN
Status: DISCONTINUED | OUTPATIENT
Start: 2017-02-15 | End: 2017-02-15 | Stop reason: HOSPADM

## 2017-02-15 RX ORDER — PANTOPRAZOLE SODIUM 40 MG/1
40 TABLET, DELAYED RELEASE ORAL DAILY
Qty: 30 TABLET | Refills: 0 | Status: SHIPPED | OUTPATIENT
Start: 2017-02-15 | End: 2017-03-09

## 2017-02-15 RX ORDER — MEPERIDINE HYDROCHLORIDE 50 MG/ML
12.5 INJECTION INTRAMUSCULAR; INTRAVENOUS; SUBCUTANEOUS ONCE AS NEEDED
Status: DISCONTINUED | OUTPATIENT
Start: 2017-02-15 | End: 2017-02-15 | Stop reason: HOSPADM

## 2017-02-15 RX ORDER — FENTANYL CITRATE 50 UG/ML
25 INJECTION, SOLUTION INTRAMUSCULAR; INTRAVENOUS EVERY 5 MIN PRN
Status: DISCONTINUED | OUTPATIENT
Start: 2017-02-15 | End: 2017-02-15 | Stop reason: HOSPADM

## 2017-02-15 RX ORDER — PROPOFOL 10 MG/ML
VIAL (ML) INTRAVENOUS
Status: DISCONTINUED | OUTPATIENT
Start: 2017-02-15 | End: 2017-02-15

## 2017-02-15 RX ORDER — SODIUM CHLORIDE, SODIUM LACTATE, POTASSIUM CHLORIDE, CALCIUM CHLORIDE 600; 310; 30; 20 MG/100ML; MG/100ML; MG/100ML; MG/100ML
INJECTION, SOLUTION INTRAVENOUS CONTINUOUS PRN
Status: DISCONTINUED | OUTPATIENT
Start: 2017-02-15 | End: 2017-02-15

## 2017-02-15 RX ORDER — OXYCODONE HYDROCHLORIDE 5 MG/1
5 TABLET ORAL
Status: DISCONTINUED | OUTPATIENT
Start: 2017-02-15 | End: 2017-02-15 | Stop reason: HOSPADM

## 2017-02-15 RX ORDER — SODIUM CHLORIDE 0.9 % (FLUSH) 0.9 %
3 SYRINGE (ML) INJECTION
Status: DISCONTINUED | OUTPATIENT
Start: 2017-02-15 | End: 2017-02-15 | Stop reason: HOSPADM

## 2017-02-15 RX ORDER — ONDANSETRON 2 MG/ML
4 INJECTION INTRAMUSCULAR; INTRAVENOUS ONCE AS NEEDED
Status: DISCONTINUED | OUTPATIENT
Start: 2017-02-15 | End: 2017-02-15 | Stop reason: HOSPADM

## 2017-02-15 RX ORDER — SODIUM CHLORIDE 9 MG/ML
INJECTION, SOLUTION INTRAVENOUS CONTINUOUS
Status: DISCONTINUED | OUTPATIENT
Start: 2017-02-15 | End: 2017-02-15 | Stop reason: HOSPADM

## 2017-02-15 RX ADMIN — SODIUM CHLORIDE, SODIUM LACTATE, POTASSIUM CHLORIDE, AND CALCIUM CHLORIDE: 600; 310; 30; 20 INJECTION, SOLUTION INTRAVENOUS at 09:02

## 2017-02-15 RX ADMIN — PROPOFOL 120 MG: 10 INJECTION, EMULSION INTRAVENOUS at 09:02

## 2017-02-15 RX ADMIN — PANTOPRAZOLE SODIUM 40 MG: 40 INJECTION, POWDER, FOR SOLUTION INTRAVENOUS at 11:02

## 2017-02-15 NOTE — TRANSFER OF CARE
"Anesthesia Transfer of Care Note    Patient: Robert Mcfarland    Procedure(s) Performed: Procedure(s) (LRB):  ESOPHAGOGASTRODUODENOSCOPY (EGD) (N/A)    Patient location: PACU    Anesthesia Type: general    Transport from OR: Transported from OR on room air with adequate spontaneous ventilation    Post pain: adequate analgesia    Post assessment: no apparent anesthetic complications    Post vital signs: stable    Level of consciousness: awake    Nausea/Vomiting: no nausea/vomiting    Complications: none          Last vitals:   Visit Vitals    BP (!) 143/84 (BP Location: Right arm, Patient Position: Lying, BP Method: Automatic)    Pulse 97    Temp 36.6 °C (97.9 °F) (Oral)    Resp 18    Ht 5' 7" (1.702 m)    Wt 63.5 kg (140 lb)    SpO2 100%    BMI 21.93 kg/m2     "

## 2017-02-15 NOTE — PLAN OF CARE
Problem: Patient Care Overview  Goal: Plan of Care Review  Outcome: Ongoing (interventions implemented as appropriate)  Eager & in agreement with discharge. Verbal understanding of discharge instructions-- paperwork passed and explained to patient and family.  IV removed w/ cath tip intact, no redness or edema, placed pressure dressing with Coban and gauze.  To be DCd home self care with cab voucher.  Free from falls, injury, or skin breakdown this hospital admission.

## 2017-02-15 NOTE — PLAN OF CARE
Problem: Patient Care Overview  Goal: Plan of Care Review  Outcome: Ongoing (interventions implemented as appropriate)  Pt free of trauma, falls, and injury. Pt VSS and afebrile throughout shift. Pt free of skin breakdown.Pt denies pain throughout shift. Pt has been eating and voiding adequately throughout shift. Pt NPO after MN. Purposeful rounding done. Pt has call light in reach, bed brakes on, side rails up x2, bed in low position, Pt refuse SCDs, and nonskid socks on. Pt lying in bed in no distress. Will continue to monitor.

## 2017-02-15 NOTE — DISCHARGE SUMMARY
[facility]  Discharge Summary      Admit Date: 2/13/2017    Discharge Date and Time:  02/15/2017 12:09 PM    Attending Physician: Michael Pa MD     Reason for Admission: Hematemesis    Procedures Performed: Procedure(s) (LRB):  ESOPHAGOGASTRODUODENOSCOPY (EGD) (N/A)    Hospital Course:  Mr. Robert Mcfarland is a 46 year-old man current tobacco smoker and occasional alcohol user  who presents with hematemesis.  Patient admitted to the hospital and volume resuscitated and treated with proton pump inhibitor therapy.  Patient hemodynamically stable and serial hemoglobin levels following correction of hemoconcentration remained stable.  Gastroenterology service consulted and performed upper endoscopy today which revealed evidence of gastritis.  Patient advised to take daily oral proton pump inhibitor therapy, quit tobacco smoking and alcohol use, and follow-up with gastroenterology clinic to follow-up on results of biopsies and to arrange for an outpatient colonoscopy.  Patient also advised to follow-up with his primary care provider.    Consults: GI    Final Diagnoses:    Principal Problem: Hematemesis   Secondary Diagnoses:   Active Hospital Problems    Diagnosis  POA    *Hematemesis [K92.0]  Yes    Melena [K92.1]  Unknown    Paroxysmal tachycardia [I47.9]  Unknown    Weakness [R53.1]  Yes      Resolved Hospital Problems    Diagnosis Date Resolved POA   No resolved problems to display.       Discharged Condition: Stable    Disposition: Home or Self Care    Follow Up/Patient Instructions:     Medications:  Reconciled Home Medications:   Current Discharge Medication List      START taking these medications    Details   pantoprazole (PROTONIX) 40 MG tablet Take 1 tablet (40 mg total) by mouth once daily.  Qty: 30 tablet, Refills: 0         STOP taking these medications       UNKNOWN TO PATIENT Comments:   Reason for Stopping:               Discharge Procedure Orders  Diet general     Activity as  tolerated     Call MD for:  temperature >100.4     Call MD for:  persistent nausea and vomiting or diarrhea     Call MD for:  severe uncontrolled pain     Call MD for:  difficulty breathing or increased cough     Call MD for:  severe persistent headache     Call MD for:  worsening rash     Call MD for:  persistent dizziness, light-headedness, or visual disturbances     Call MD for:  increased confusion or weakness       Follow-up Information     Schedule an appointment as soon as possible for a visit with [facility].    Specialties:  Internal Medicine, Family Medicine    Contact information:    [address]  [facility] [address]  [phone]          Follow up On 3/24/2017.    Why:  @830 AM  MD APPT  WITH VIRY FISCHER        Follow up with Georgette Ty MD. Schedule an appointment as soon as possible for a visit in 2 weeks.    Specialty:  Gastroenterology    Contact information:    [address]  [address]  [facility] [address]  [phone]          Time: 25 minutes spent coordinating and completing discharge.

## 2017-02-15 NOTE — OP NOTE
EGD : HH             Severe gastritis s/p biopsy             Mild duodenitis  REC; F/U Biopsy results            PPI QD            Out Pt Colonoscopy

## 2017-02-15 NOTE — ANESTHESIA POSTPROCEDURE EVALUATION
"Anesthesia Post Evaluation    Patient: Robert Mcfarland    Procedure(s) Performed: Procedure(s) (LRB):  ESOPHAGOGASTRODUODENOSCOPY (EGD) (N/A)    Final Anesthesia Type: general  Patient location during evaluation: PACU  Patient participation: Yes- Able to Participate  Level of consciousness: awake and alert  Post-procedure vital signs: reviewed and stable  Pain management: adequate  Airway patency: patent  PONV status at discharge: No PONV  Anesthetic complications: no      Cardiovascular status: blood pressure returned to baseline  Respiratory status: unassisted and spontaneous ventilation  Hydration status: euvolemic  Follow-up not needed.        Visit Vitals    /89    Pulse 73    Temp 36.8 °C (98.2 °F)    Resp 16    Ht 5' 7" (1.702 m)    Wt 63.5 kg (140 lb)    SpO2 100%    BMI 21.93 kg/m2       Pain/Darshan Score: Pain Assessment Performed: Yes (2/15/2017 10:00 AM)  Presence of Pain: denies (2/15/2017 10:00 AM)  Pain Rating Prior to Med Admin: 0 (2/15/2017  9:08 AM)  Darshan Score: 10 (2/15/2017 10:00 AM)      "

## 2017-03-08 ENCOUNTER — HOSPITAL ENCOUNTER (EMERGENCY)
Facility: OTHER | Age: 47
Discharge: HOME | End: 2017-03-09
Attending: EMERGENCY MEDICINE
Payer: MEDICAID

## 2017-03-08 DIAGNOSIS — R10.11 ABDOMINAL PAIN, RIGHT UPPER QUADRANT: Primary | ICD-10-CM

## 2017-03-08 DIAGNOSIS — K62.3 RECTAL MUCOSA PROLAPSE: ICD-10-CM

## 2017-03-08 PROCEDURE — 99284 EMERGENCY DEPT VISIT MOD MDM: CPT | Mod: 25

## 2017-03-08 PROCEDURE — 96374 THER/PROPH/DIAG INJ IV PUSH: CPT

## 2017-03-08 NOTE — ED AVS SNAPSHOT
[facility]  [address] [address]               Robert Mcfarland   3/8/2017 11:55 PM   ED    Description:  Male : 1970   Department:  [facility]           Your Care was Coordinated By:     Provider Role From To    Nara Case MD Attending Provider 17 0001 --      Reason for Visit     Abdominal Pain           Diagnoses this Visit        Comments    Abdominal pain, right upper quadrant    -  Primary     Rectal mucosa prolapse           ED Disposition     None           To Do List           Follow-up Information     Schedule an appointment as soon as possible for a visit with [facility].    Specialties:  Internal Medicine, Family Medicine    Contact information:    [address] [address]  [phone]          Schedule an appointment as soon as possible for a visit with [facility].    Specialty:  Gastroenterology    Contact information:    [address]  [address]  [facility] [id number] [phone]          Follow up with [facility].    Specialty:  Emergency Medicine    Why:  As needed, If symptoms worsen    Contact information:    [address] [address] [phone]       These Medications        Disp Refills Start End    pantoprazole (PROTONIX) 40 MG tablet 30 tablet 1 3/9/2017 3/9/2018    Take 1 tablet (40 mg total) by mouth once daily. - Oral    Pharmacy: [id number] - [place], [place] - [address] AT [facility] Ph #: [phone]         [facility]     [facility] Care Line -  Assistance  Registered nurses in the [facility] provide clinical advisement, health education, appointment booking, and other advisory services.  Call for this free service at [phone].             Medications           Message  "regarding Medications     Verify the changes and/or additions to your medication regime listed below are the same as discussed with your clinician today.  If any of these changes or additions are incorrect, please notify your healthcare provider.        START taking these NEW medications        Refills    pantoprazole (PROTONIX) 40 MG tablet 1    Sig: Take 1 tablet (40 mg total) by mouth once daily.    Class: Print    Route: Oral      These medications were administered today        Dose Freq    famotidine (PF) 20 mg/2 mL injection 20 mg 20 mg ED 1 Time    Sig: Inject 2 mLs (20 mg total) into the vein ED 1 Time.    Class: Normal    Route: Intravenous    (pyxis) gi cocktail (mylanta 30 mL, lidocaine 2 % viscous 10 mL, dicyclomine 10 mL) 50 mL  ED 1 Time    Sig: Take by mouth ED 1 Time.    Class: Normal    Route: Oral           Verify that the below list of medications is an accurate representation of the medications you are currently taking.  If none reported, the list may be blank. If incorrect, please contact your healthcare provider. Carry this list with you in case of emergency.           Current Medications     pantoprazole (PROTONIX) 40 MG tablet Take 1 tablet (40 mg total) by mouth once daily.           Clinical Reference Information           Your Vitals Were     BP Pulse Temp Resp Height Weight    140/80 (BP Location: Right arm, Patient Position: Sitting, BP Method: Automatic) 86 97.8 °F (36.6 °C) (Oral) 16 5' 7" (1.702 m) 68 kg (150 lb)    SpO2 BMI             96% 23.49 kg/m2         Allergies as of 3/9/2017     No Known Allergies      Immunizations Administered on Date of Encounter - 3/9/2017     None      ED Micro, Lab, POCT     Start Ordered       Status Ordering Provider    03/09/17 0033 03/09/17 0033  CBC auto differential  STAT      Final result     03/09/17 0033 03/09/17 0033  Comprehensive metabolic panel  STAT      Final result     03/09/17 0033 03/09/17 0033  Lipase  STAT      Final result     "   ED Imaging Orders     None      Discharge References/Attachments     ABDOMINAL PAIN, ADULT (ENGLISH)    RECTAL PROLAPSE (ENGLISH)      [facility] Sign-Up     Activating your [facility] account is as easy as 1-2-3!     1) Visit [email or web address], select Sign Up Now, enter this activation code and your date of birth, then select Next.  [email or web address]  Expires: 4/1/2017 12:20 PM      2) Create a username and password to use when you visit [facility] in the future and select a security question in case you lose your password and select Next.    3) Enter your e-mail address and click Sign Up!    Additional Information  If you have questions, please e-mail [email or web address] or call [phone] to talk to our [facility] staff. Remember, [facility] is NOT to be used for urgent needs. For medical emergencies, dial 911.          [facility] complies with applicable Federal civil rights laws and does not discriminate on the basis of race, color, national origin, age, disability, or sex.        Language Assistance Services     ATTENTION: Language assistance services are available, free of charge. Please call [phone].      ATENCIÓN: Si habla maurilio, tiene a smith disposición servicios gratuitos de asistencia lingüística. Llame al [phone].     CHÚ Ý: N?u b?n nói Ti?ng Vi?t, có các d?ch v? h? tr? ngôn ng? mi?n phí dành cho b?n. G?i s? [phone].

## 2017-03-09 VITALS
OXYGEN SATURATION: 97 % | RESPIRATION RATE: 16 BRPM | WEIGHT: 150 LBS | TEMPERATURE: 98 F | SYSTOLIC BLOOD PRESSURE: 138 MMHG | DIASTOLIC BLOOD PRESSURE: 79 MMHG | HEIGHT: 67 IN | BODY MASS INDEX: 23.54 KG/M2 | HEART RATE: 81 BPM

## 2017-03-09 LAB
ALBUMIN SERPL BCP-MCNC: 4.1 G/DL
ALP SERPL-CCNC: 71 U/L
ALT SERPL W/O P-5'-P-CCNC: 11 U/L
ANION GAP SERPL CALC-SCNC: 17 MMOL/L
AST SERPL-CCNC: 29 U/L
BASOPHILS # BLD AUTO: 0.03 K/UL
BASOPHILS NFR BLD: 0.5 %
BILIRUB SERPL-MCNC: 0.7 MG/DL
BUN SERPL-MCNC: 6 MG/DL
CALCIUM SERPL-MCNC: 9 MG/DL
CHLORIDE SERPL-SCNC: 103 MMOL/L
CO2 SERPL-SCNC: 24 MMOL/L
CREAT SERPL-MCNC: 0.7 MG/DL
DIFFERENTIAL METHOD: ABNORMAL
EOSINOPHIL # BLD AUTO: 0.1 K/UL
EOSINOPHIL NFR BLD: 0.8 %
ERYTHROCYTE [DISTWIDTH] IN BLOOD BY AUTOMATED COUNT: 18 %
EST. GFR  (AFRICAN AMERICAN): >60 ML/MIN/1.73 M^2
EST. GFR  (NON AFRICAN AMERICAN): >60 ML/MIN/1.73 M^2
GLUCOSE SERPL-MCNC: 83 MG/DL
HCT VFR BLD AUTO: 34 %
HGB BLD-MCNC: 10.9 G/DL
LIPASE SERPL-CCNC: 71 U/L
LYMPHOCYTES # BLD AUTO: 2.4 K/UL
LYMPHOCYTES NFR BLD: 36.3 %
MCH RBC QN AUTO: 27.5 PG
MCHC RBC AUTO-ENTMCNC: 32.1 %
MCV RBC AUTO: 86 FL
MONOCYTES # BLD AUTO: 0.4 K/UL
MONOCYTES NFR BLD: 5.4 %
NEUTROPHILS # BLD AUTO: 3.7 K/UL
NEUTROPHILS NFR BLD: 56.7 %
PLATELET # BLD AUTO: 227 K/UL
PMV BLD AUTO: 8.2 FL
POTASSIUM SERPL-SCNC: 3.1 MMOL/L
PROT SERPL-MCNC: 8.2 G/DL
RBC # BLD AUTO: 3.97 M/UL
SODIUM SERPL-SCNC: 144 MMOL/L
WBC # BLD AUTO: 6.5 K/UL

## 2017-03-09 PROCEDURE — 83690 ASSAY OF LIPASE: CPT

## 2017-03-09 PROCEDURE — 80053 COMPREHEN METABOLIC PANEL: CPT

## 2017-03-09 PROCEDURE — 25000003 PHARM REV CODE 250: Performed by: EMERGENCY MEDICINE

## 2017-03-09 PROCEDURE — 85025 COMPLETE CBC W/AUTO DIFF WBC: CPT

## 2017-03-09 RX ORDER — FAMOTIDINE 10 MG/ML
20 INJECTION INTRAVENOUS
Status: COMPLETED | OUTPATIENT
Start: 2017-03-09 | End: 2017-03-09

## 2017-03-09 RX ORDER — PANTOPRAZOLE SODIUM 40 MG/1
40 TABLET, DELAYED RELEASE ORAL DAILY
Qty: 30 TABLET | Refills: 1 | Status: SHIPPED | OUTPATIENT
Start: 2017-03-09 | End: 2017-12-04 | Stop reason: ALTCHOICE

## 2017-03-09 RX ADMIN — ALUMINUM HYDROXIDE, MAGNESIUM HYDROXIDE, SIMETHICONE: 400; 400; 40 SUSPENSION ORAL at 01:03

## 2017-03-09 RX ADMIN — FAMOTIDINE 20 MG: 10 INJECTION INTRAVENOUS at 12:03

## 2017-03-09 NOTE — ED PROVIDER NOTES
"Encounter Date: 3/8/2017    SCRIBE #1 NOTE: I, Lamar Irby , am scribing for, and in the presence of, Dr. Case .       History     Chief Complaint   Patient presents with    Abdominal Pain     pt c/o "burning and tightness" in the upper abdominal region pt reports it feels similar to when he had his ulcers     Review of patient's allergies indicates:  No Known Allergies  HPI Comments: Time seen by provider: 12:19 AM    This is a 46 y.o. male who presents with complaint of abdominal pain. Symptoms began today. Upper abdominal pain is described as a mild "burning" sensation. Pt reports subjective fever, dizziness, blood in stool, and generalized weakness, but denies chills, nausea, vomiting, diarrhea, cough, rhinorrhea, congestion, sneezing, chest pain, SOB, or any urinary symptoms. Pt noticed maroon colored stool with "bright red" streaks of blood three weeks ago, denies any alleviating factors, and admits to use of tobacco (1 pk/day), alcohol, and marijuana.     The history is provided by the patient.     Past Medical History:   Diagnosis Date    Alcohol abuse     Hypertension     Stomach ulcer      Past Surgical History:   Procedure Laterality Date    ABDOMINAL SURGERY       History reviewed. No pertinent family history.  Social History   Substance Use Topics    Smoking status: Current Every Day Smoker     Types: Cigarettes    Smokeless tobacco: None      Comment: a pack a week    Alcohol use 1.2 oz/week     2 Cans of beer per week     Review of Systems   Constitutional: Positive for fever. Negative for chills.   HENT: Negative for congestion, rhinorrhea, sneezing and sore throat.    Eyes: Negative for redness and visual disturbance.   Respiratory: Negative for cough and shortness of breath.    Cardiovascular: Negative for chest pain and palpitations.   Gastrointestinal: Positive for abdominal pain and blood in stool. Negative for diarrhea, nausea and vomiting.   Genitourinary: Negative for decreased " urine volume, difficulty urinating, dysuria, frequency, hematuria and urgency.   Musculoskeletal: Negative for back pain.   Skin: Negative for rash.   Neurological: Positive for dizziness and weakness. Negative for headaches.   Psychiatric/Behavioral: Negative for confusion.       Physical Exam   Initial Vitals   BP Pulse Resp Temp SpO2   03/08/17 2241 03/08/17 2241 03/08/17 2241 03/08/17 2241 03/08/17 2241   160/94 75 16 97.6 °F (36.4 °C) 100 %     Physical Exam    Nursing note and vitals reviewed.  Constitutional: He appears well-developed and well-nourished. He is not diaphoretic. No distress.   HENT:   Head: Normocephalic and atraumatic.   Right Ear: External ear normal.   Left Ear: External ear normal.   Eyes: EOM are normal. Pupils are equal, round, and reactive to light. Right eye exhibits no discharge. Left eye exhibits no discharge.   [place] conjunctivae.    Neck: Normal range of motion.   Cardiovascular: Normal rate, regular rhythm and normal heart sounds. Exam reveals no gallop and no friction rub.    No murmur heard.  Pulmonary/Chest: Breath sounds normal. No respiratory distress. He has no wheezes. He has no rhonchi. He has no rales.   Abdominal: Soft. There is tenderness. There is no rebound and no guarding.   Mild tenderness to palpation to the RUQ.    Genitourinary:   Genitourinary Comments: Small mucosal rectal prolapse. No bleeding or tenderness to palpation. Pt refused full rectal exam.    Musculoskeletal: Normal range of motion. He exhibits no edema or tenderness.   Lymphadenopathy:     He has no cervical adenopathy.   Neurological: He is alert and oriented to person, place, and time.   Skin: Skin is warm and dry. No rash and no abscess noted. No erythema. No pallor.   Psychiatric: He has a normal mood and affect. His behavior is normal. Judgment and thought content normal.         ED Course   Procedures  Labs Reviewed   CBC W/ AUTO DIFFERENTIAL - Abnormal; Notable for the following:        Result  Value    RBC 3.97 (*)     Hemoglobin 10.9 (*)     Hematocrit 34.0 (*)     RDW 18.0 (*)     MPV 8.2 (*)     All other components within normal limits   COMPREHENSIVE METABOLIC PANEL - Abnormal; Notable for the following:     Potassium 3.1 (*)     Anion Gap 17 (*)     All other components within normal limits   LIPASE - Abnormal; Notable for the following:     Lipase 71 (*)     All other components within normal limits             Medical Decision Making:   Clinical Tests:   Lab Tests: Ordered and Reviewed  ED Management:  Emergent evaluation a 46-year-old male with complaint of abdominal pain and blood in the stool.  On exam he is Mild right upper quadrant tenderness but negative Simms's sign, nonsurgical abdomen.  He also had small mucosal rectal prolapse present.  Patient refused attempt at reduction or full rectal exam.  Vital signs are benign, afebrile.  Labs reveal mild anemia, mild hypokalemia, and mild elevation of lipase.  This is consistent with previous lab values on file.  Since he also complained of general weakness and dizziness, checked EKG with no significant dysrhythmia or ischemia present.  He was treated with Pepcid and a GI cocktail with improvement.  He is discharged in good condition with prescription for Protonix.  I doubt acute cholecystitis or acute surgical process.  He is advised close follow-up with PCP in gastroenterology, return for new or worsening symptoms.            Scribe Attestation:   Scribe #1: I performed the above scribed service and the documentation accurately describes the services I performed. I attest to the accuracy of the note.    Attending Attestation:           Physician Attestation for Scribe:  Physician Attestation Statement for Scribe #1: I, Dr. Case , reviewed documentation, as scribed by Lamar Irby  in my presence, and it is both accurate and complete.                 ED Course     Clinical Impression:     1. Abdominal pain, right upper quadrant    2. Rectal  mucosa prolapse                Nara Case MD  03/09/17 4052

## 2017-03-09 NOTE — ED TRIAGE NOTES
Pt has remained talking on his phone throughout the entire triage process, pt also arrived by ambulance and then left after arriving to the lobby and then came back

## 2017-03-09 NOTE — ED TRIAGE NOTES
Pt c/o lightheadedness and abd starting a couple of hours ago.  Denies N/V/D.  Unsure if he had any fever.  Reports dark red blood in stool starting day before yesterday.

## 2019-04-17 ENCOUNTER — HOSPITAL ENCOUNTER (EMERGENCY)
Facility: OTHER | Age: 49
Discharge: HOME | End: 2019-04-17
Attending: EMERGENCY MEDICINE
Payer: MEDICAID

## 2019-04-17 VITALS
HEART RATE: 84 BPM | WEIGHT: 150 LBS | BODY MASS INDEX: 23.54 KG/M2 | RESPIRATION RATE: 19 BRPM | TEMPERATURE: 98 F | SYSTOLIC BLOOD PRESSURE: 118 MMHG | HEIGHT: 67 IN | OXYGEN SATURATION: 95 % | DIASTOLIC BLOOD PRESSURE: 63 MMHG

## 2019-04-17 DIAGNOSIS — R07.9 CHEST PAIN: Primary | ICD-10-CM

## 2019-04-17 LAB
ALBUMIN SERPL BCP-MCNC: 3.9 G/DL (ref 3.5–5.2)
ALP SERPL-CCNC: 80 U/L (ref 55–135)
ALT SERPL W/O P-5'-P-CCNC: 14 U/L (ref 10–44)
ANION GAP SERPL CALC-SCNC: 18 MMOL/L (ref 8–16)
AST SERPL-CCNC: 24 U/L (ref 10–40)
BASOPHILS # BLD AUTO: 0.04 K/UL (ref 0–0.2)
BASOPHILS NFR BLD: 0.5 % (ref 0–1.9)
BILIRUB SERPL-MCNC: 0.3 MG/DL (ref 0.1–1)
BNP SERPL-MCNC: 14 PG/ML (ref 0–99)
BUN SERPL-MCNC: 15 MG/DL (ref 6–20)
CALCIUM SERPL-MCNC: 9.5 MG/DL (ref 8.7–10.5)
CHLORIDE SERPL-SCNC: 101 MMOL/L (ref 95–110)
CO2 SERPL-SCNC: 22 MMOL/L (ref 23–29)
CREAT SERPL-MCNC: 0.8 MG/DL (ref 0.5–1.4)
DIFFERENTIAL METHOD: ABNORMAL
EOSINOPHIL # BLD AUTO: 0 K/UL (ref 0–0.5)
EOSINOPHIL NFR BLD: 0.5 % (ref 0–8)
ERYTHROCYTE [DISTWIDTH] IN BLOOD BY AUTOMATED COUNT: 15.7 % (ref 11.5–14.5)
EST. GFR  (AFRICAN AMERICAN): >60 ML/MIN/1.73 M^2
EST. GFR  (NON AFRICAN AMERICAN): >60 ML/MIN/1.73 M^2
GLUCOSE SERPL-MCNC: 60 MG/DL (ref 70–110)
HCT VFR BLD AUTO: 32.6 % (ref 40–54)
HGB BLD-MCNC: 10.6 G/DL (ref 14–18)
LIPASE SERPL-CCNC: 50 U/L (ref 4–60)
LYMPHOCYTES # BLD AUTO: 2.8 K/UL (ref 1–4.8)
LYMPHOCYTES NFR BLD: 34.2 % (ref 18–48)
MCH RBC QN AUTO: 27.5 PG (ref 27–31)
MCHC RBC AUTO-ENTMCNC: 32.5 G/DL (ref 32–36)
MCV RBC AUTO: 85 FL (ref 82–98)
MONOCYTES # BLD AUTO: 0.5 K/UL (ref 0.3–1)
MONOCYTES NFR BLD: 6.1 % (ref 4–15)
NEUTROPHILS # BLD AUTO: 4.7 K/UL (ref 1.8–7.7)
NEUTROPHILS NFR BLD: 58.6 % (ref 38–73)
PLATELET # BLD AUTO: 384 K/UL (ref 150–350)
PMV BLD AUTO: 8.5 FL (ref 9.2–12.9)
POTASSIUM SERPL-SCNC: 3.2 MMOL/L (ref 3.5–5.1)
PROT SERPL-MCNC: 8.6 G/DL (ref 6–8.4)
RBC # BLD AUTO: 3.86 M/UL (ref 4.6–6.2)
SODIUM SERPL-SCNC: 141 MMOL/L (ref 136–145)
TROPONIN I SERPL DL<=0.01 NG/ML-MCNC: <0.006 NG/ML (ref 0–0.03)
TROPONIN I SERPL DL<=0.01 NG/ML-MCNC: <0.006 NG/ML (ref 0–0.03)
WBC # BLD AUTO: 8.03 K/UL (ref 3.9–12.7)

## 2019-04-17 PROCEDURE — 93010 EKG 12-LEAD: ICD-10-PCS | Mod: ,,, | Performed by: INTERNAL MEDICINE

## 2019-04-17 PROCEDURE — 83690 ASSAY OF LIPASE: CPT

## 2019-04-17 PROCEDURE — 83880 ASSAY OF NATRIURETIC PEPTIDE: CPT

## 2019-04-17 PROCEDURE — 93010 ELECTROCARDIOGRAM REPORT: CPT | Mod: ,,, | Performed by: INTERNAL MEDICINE

## 2019-04-17 PROCEDURE — 85025 COMPLETE CBC W/AUTO DIFF WBC: CPT

## 2019-04-17 PROCEDURE — 93005 ELECTROCARDIOGRAM TRACING: CPT

## 2019-04-17 PROCEDURE — 84484 ASSAY OF TROPONIN QUANT: CPT

## 2019-04-17 PROCEDURE — 99283 EMERGENCY DEPT VISIT LOW MDM: CPT

## 2019-04-17 PROCEDURE — 25000003 PHARM REV CODE 250: Performed by: EMERGENCY MEDICINE

## 2019-04-17 PROCEDURE — 80053 COMPREHEN METABOLIC PANEL: CPT

## 2019-04-17 RX ORDER — SUCRALFATE 1 G/1
1 TABLET ORAL 4 TIMES DAILY
Qty: 20 TABLET | Refills: 0 | Status: ON HOLD | OUTPATIENT
Start: 2019-04-17 | End: 2021-10-10 | Stop reason: HOSPADM

## 2019-04-17 RX ORDER — PANTOPRAZOLE SODIUM 20 MG/1
20 TABLET, DELAYED RELEASE ORAL DAILY
Qty: 30 TABLET | Refills: 0 | Status: SHIPPED | OUTPATIENT
Start: 2019-04-17 | End: 2019-05-17

## 2019-04-17 RX ADMIN — LIDOCAINE HYDROCHLORIDE: 20 SOLUTION ORAL; TOPICAL at 01:04

## 2019-04-17 NOTE — ED NOTES
NEURO: Pt AAO x 4. Behavior and speech appropriate to situation.   CARDIAC: Regular rhythm auscultated, + chest pain  RESPIRATORY: Respirations even and unlabored.   MUSCULOSKELETAL: Active ROM noted to extremities

## 2019-04-17 NOTE — ED PROVIDER NOTES
Encounter Date: 4/17/2019    SCRIBE #1 NOTE: ISol, am scribing for, and in the presence of, Dr. Puente.       History     Chief Complaint   Patient presents with    Chest Pain     mid sternal chest pain. given 325 mg asa, 3 spray ntg pta with no relief.      Time seen by provider: 1:19 AM    This is a 48 y.o. male, with history of HTN and prior stroke, who presents to the ED via EMS with complaint of sudden onset of left-sided chest pain while walking PTA. Patient states the pain is still present. Patient states pain became more intense and became short of breath. Patient reports left-sided abdominal pain when applying pressure. Patient states he did not eat anything before to trigger the pain. Patient denies getting sweaty or vomiting. Patient denies sour taste in mouth or burning sensation in chest or stomach. Patient states he does not take aspirin everyday, but does take hypertension medications (lisinopril) daily. Patient denies history or family history of heart attacks. Patient states stroke occurred two months ago, but denies any weakness. Patient admits to use of tobacco, but denies illicit drugs.    The history is provided by the patient.     Review of patient's allergies indicates:  No Known Allergies  Past Medical History:   Diagnosis Date    Alcohol abuse     Hypertension     Stomach ulcer     Stroke     self reported     Past Surgical History:   Procedure Laterality Date    ABDOMINAL SURGERY      ESOPHAGOGASTRODUODENOSCOPY (EGD) N/A 2/15/2017    Performed by Georgette Ty MD at [facility]     No family history on file.  Social History     Tobacco Use    Smoking status: Current Every Day Smoker     Types: Cigarettes    Tobacco comment: a pack a week   Substance Use Topics    Alcohol use: Yes     Alcohol/week: 1.2 oz     Types: 2 Cans of beer per week    Drug use: Yes     Types: Marijuana     Comment: occasionally     Review of Systems   Constitutional: Negative for  diaphoresis and fever.   HENT: Negative for sore throat.    Respiratory: Positive for shortness of breath.    Cardiovascular: Positive for chest pain.   Gastrointestinal: Positive for abdominal pain. Negative for nausea and vomiting.   Genitourinary: Negative for dysuria.   Musculoskeletal: Negative for back pain.   Skin: Negative for rash.   Neurological: Negative for weakness.   Hematological: Does not bruise/bleed easily.       Physical Exam     Initial Vitals [04/17/19 0100]   BP Pulse Resp Temp SpO2   107/68 80 18 98.1 °F (36.7 °C) 98 %      MAP       --         Physical Exam    Nursing note and vitals reviewed.  Constitutional: He appears well-developed and well-nourished. He is not diaphoretic. No distress.   HENT:   Head: Normocephalic and atraumatic.   Eyes: Conjunctivae and EOM are normal. No scleral icterus.   Neck: Normal range of motion. Neck supple.   Cardiovascular: Normal rate, regular rhythm and normal heart sounds. Exam reveals no gallop and no friction rub.    No murmur heard.  Pulmonary/Chest: Breath sounds normal. No respiratory distress. He has no wheezes. He has no rhonchi. He has no rales.   Abdominal: Soft. Bowel sounds are normal. He exhibits no distension. There is tenderness. There is no rebound and no guarding.   Mild left, upper quadrant tenderness.   Musculoskeletal: Normal range of motion. He exhibits no edema or tenderness.   Lymphadenopathy:     He has no cervical adenopathy.   Neurological: He is alert and oriented to person, place, and time.   Skin: Skin is warm and dry. No rash noted. No erythema. No pallor.   Psychiatric: He has a normal mood and affect. His behavior is normal. Judgment and thought content normal.         ED Course   Procedures  Labs Reviewed   CBC W/ AUTO DIFFERENTIAL - Abnormal; Notable for the following components:       Result Value    RBC 3.86 (*)     Hemoglobin 10.6 (*)     Hematocrit 32.6 (*)     RDW 15.7 (*)     Platelets 384 (*)     MPV 8.5 (*)     All  other components within normal limits   COMPREHENSIVE METABOLIC PANEL - Abnormal; Notable for the following components:    Potassium 3.2 (*)     CO2 22 (*)     Glucose 60 (*)     Total Protein 8.6 (*)     Anion Gap 18 (*)     All other components within normal limits   TROPONIN I   B-TYPE NATRIURETIC PEPTIDE   LIPASE   TROPONIN I     EKG Readings: (Independently Interpreted)   Initial Reading: No STEMI. Previous EKG: Compared with most recent EKG   0118: Normal sinus rhythm. Rate of 76 bpm. Normal axis. Narrow QRS. LVH present. Unchanged from 2017.       Imaging Results          X-Ray Chest PA And Lateral (Final result)  Result time 04/17/19 01:49:09    Final result by Cory Berger MD (04/17/19 01:49:09)                 Impression:      1.  Irregular lucency through the mid aspect of the sternal body which could represent minimally displaced sternal fracture.  Correlation with point tenderness and any patient history of recent trauma is advised.  In the absence of history of trauma, CT could be performed for further characterization as clinically warranted.    2.  No evidence of confluent airspace consolidation or pneumothorax.      Electronically signed by: Cory Berger MD  Date:    04/17/2019  Time:    01:49             Narrative:    EXAMINATION:  XR CHEST PA AND LATERAL    CLINICAL HISTORY:  Chest Pain;    TECHNIQUE:  PA and lateral views of the chest were performed.    COMPARISON:  02/13/2017    FINDINGS:  Cardiac monitoring leads overlie the chest.  The cardiomediastinal silhouette appears within normal limits.  The lungs are symmetrically aerated without evidence of focal airspace consolidation.  There is no pleural effusion or pneumothorax.  There is an apparent irregular linear lucency identified through the mid aspect of the sternal body transversing the cortex.  There is questionable adjacent ossification which could represent some component of callus formation.  The remaining osseous structures  appear intact with degenerative changes.                              X-Rays:   Independently Interpreted Readings:   Chest X-Ray: No cardiomegaly. No focal infiltrates. No fluid overload.     Medical Decision Making:   Initial Assessment:   Urgent evaluation a 48-year-old gentleman with prior cva, etoh user, + tobacco and history of gastritis here w central chest pain that occurred while walking this evening. Pt denies cardiac hx, received asa and nitro by ems.Pt well appearing here, ekg non ischemic. Will obtain labs, and reassess.                 Independently Interpreted Test(s):   I have ordered and independently interpreted X-rays - see prior notes.  I have ordered and independently interpreted EKG Reading(s) - see prior notes  Clinical Tests:   Lab Tests: Ordered and Reviewed  Radiological Study: Ordered and Reviewed  Medical Tests: Reviewed and Ordered  ED Management:  Pt slept comfortably throughout ED stay. Repeat trop neg after 3 hours. Imaging w irregularity to mid sternum- not associated w tenderness on exam.   Pt encouraged to fu w PCP for routine fu and surveillance.             Scribe Attestation:   Scribe #1: I performed the above scribed service and the documentation accurately describes the services I performed. I attest to the accuracy of the note.    Attending Attestation:           Physician Attestation for Scribe:  Physician Attestation Statement for Scribe #1: I, Dr. Puente, reviewed documentation, as scribed by Sol Leal in my presence, and it is both accurate and complete.                    Clinical Impression:     1. Chest pain            Disposition:   Disposition: Discharged  Condition: Raghavendra Puente MD  04/17/19 0540

## 2019-04-17 NOTE — ED TRIAGE NOTES
Pt arrives via EMS with c/o chest pain onset 45 min ago. Pt states he was walking on [address] to his hotel and started with abrupt chest pain with SOB. Pt points to left upper chest to locate pain. Pt deniers diaphoresis NV. Pt received ASA and nitro X 3 with EMS, pt reports improvement of pain with EMS treatment. Pt is well appearing, pt in NAD.

## 2021-09-10 ENCOUNTER — HOSPITAL ENCOUNTER (EMERGENCY)
Facility: OTHER | Age: 51
Discharge: HOME | End: 2021-09-10
Attending: EMERGENCY MEDICINE
Payer: MEDICAID

## 2021-09-10 VITALS
BODY MASS INDEX: 24.99 KG/M2 | OXYGEN SATURATION: 97 % | DIASTOLIC BLOOD PRESSURE: 74 MMHG | TEMPERATURE: 98 F | HEIGHT: 65 IN | SYSTOLIC BLOOD PRESSURE: 116 MMHG | RESPIRATION RATE: 18 BRPM | WEIGHT: 150 LBS | HEART RATE: 89 BPM

## 2021-09-10 DIAGNOSIS — S62.305A CLOSED DISPLACED FRACTURE OF FOURTH METACARPAL BONE OF LEFT HAND, UNSPECIFIED PORTION OF METACARPAL, INITIAL ENCOUNTER: Primary | ICD-10-CM

## 2021-09-10 LAB
ALBUMIN SERPL BCP-MCNC: 3.7 G/DL (ref 3.5–5.2)
ALP SERPL-CCNC: 60 U/L (ref 55–135)
ALT SERPL W/O P-5'-P-CCNC: 14 U/L (ref 10–44)
ANION GAP SERPL CALC-SCNC: 13 MMOL/L (ref 8–16)
AST SERPL-CCNC: 20 U/L (ref 10–40)
BASOPHILS # BLD AUTO: 0.1 K/UL (ref 0–0.2)
BASOPHILS NFR BLD: 1.2 % (ref 0–1.9)
BILIRUB SERPL-MCNC: 0.2 MG/DL (ref 0.1–1)
BUN SERPL-MCNC: 13 MG/DL (ref 6–20)
CALCIUM SERPL-MCNC: 9.2 MG/DL (ref 8.7–10.5)
CHLORIDE SERPL-SCNC: 104 MMOL/L (ref 95–110)
CK SERPL-CCNC: 107 U/L (ref 20–200)
CO2 SERPL-SCNC: 22 MMOL/L (ref 23–29)
CREAT SERPL-MCNC: 0.8 MG/DL (ref 0.5–1.4)
DIFFERENTIAL METHOD: ABNORMAL
EOSINOPHIL # BLD AUTO: 0.1 K/UL (ref 0–0.5)
EOSINOPHIL NFR BLD: 1.1 % (ref 0–8)
ERYTHROCYTE [DISTWIDTH] IN BLOOD BY AUTOMATED COUNT: 13.5 % (ref 11.5–14.5)
EST. GFR  (AFRICAN AMERICAN): >60 ML/MIN/1.73 M^2
EST. GFR  (NON AFRICAN AMERICAN): >60 ML/MIN/1.73 M^2
GLUCOSE SERPL-MCNC: 106 MG/DL (ref 70–110)
HCT VFR BLD AUTO: 31.4 % (ref 40–54)
HGB BLD-MCNC: 10.1 G/DL (ref 14–18)
IMM GRANULOCYTES # BLD AUTO: 0.07 K/UL (ref 0–0.04)
IMM GRANULOCYTES NFR BLD AUTO: 0.8 % (ref 0–0.5)
LYMPHOCYTES # BLD AUTO: 2.8 K/UL (ref 1–4.8)
LYMPHOCYTES NFR BLD: 33.2 % (ref 18–48)
MCH RBC QN AUTO: 31 PG (ref 27–31)
MCHC RBC AUTO-ENTMCNC: 32.2 G/DL (ref 32–36)
MCV RBC AUTO: 96 FL (ref 82–98)
MONOCYTES # BLD AUTO: 0.7 K/UL (ref 0.3–1)
MONOCYTES NFR BLD: 8.4 % (ref 4–15)
NEUTROPHILS # BLD AUTO: 4.7 K/UL (ref 1.8–7.7)
NEUTROPHILS NFR BLD: 55.3 % (ref 38–73)
NRBC BLD-RTO: 0 /100 WBC
PLATELET # BLD AUTO: 418 K/UL (ref 150–450)
PMV BLD AUTO: 8.5 FL (ref 9.2–12.9)
POTASSIUM SERPL-SCNC: 4.3 MMOL/L (ref 3.5–5.1)
PROT SERPL-MCNC: 7.7 G/DL (ref 6–8.4)
RBC # BLD AUTO: 3.26 M/UL (ref 4.6–6.2)
SODIUM SERPL-SCNC: 139 MMOL/L (ref 136–145)
WBC # BLD AUTO: 8.55 K/UL (ref 3.9–12.7)

## 2021-09-10 PROCEDURE — 85025 COMPLETE CBC W/AUTO DIFF WBC: CPT | Performed by: EMERGENCY MEDICINE

## 2021-09-10 PROCEDURE — 99284 EMERGENCY DEPT VISIT MOD MDM: CPT | Mod: 25

## 2021-09-10 PROCEDURE — 82550 ASSAY OF CK (CPK): CPT | Performed by: EMERGENCY MEDICINE

## 2021-09-10 PROCEDURE — 29125 APPL SHORT ARM SPLINT STATIC: CPT

## 2021-09-10 PROCEDURE — 25000003 PHARM REV CODE 250: Performed by: EMERGENCY MEDICINE

## 2021-09-10 PROCEDURE — 96360 HYDRATION IV INFUSION INIT: CPT | Mod: 59

## 2021-09-10 PROCEDURE — 80053 COMPREHEN METABOLIC PANEL: CPT | Performed by: EMERGENCY MEDICINE

## 2021-09-10 RX ORDER — MUPIROCIN 20 MG/G
1 OINTMENT TOPICAL
Status: COMPLETED | OUTPATIENT
Start: 2021-09-10 | End: 2021-09-10

## 2021-09-10 RX ADMIN — MUPIROCIN 22 G: 20 OINTMENT TOPICAL at 08:09

## 2021-09-10 RX ADMIN — SODIUM CHLORIDE 1000 ML: 0.9 INJECTION, SOLUTION INTRAVENOUS at 06:09

## 2021-09-13 ENCOUNTER — TELEPHONE (OUTPATIENT)
Dept: PRIMARY CARE CLINIC | Facility: CLINIC | Age: 51
End: 2021-09-13

## 2021-10-06 ENCOUNTER — HOSPITAL ENCOUNTER (INPATIENT)
Facility: HOSPITAL | Age: 51
LOS: 4 days | Discharge: HOME | DRG: 896 | End: 2021-10-10
Attending: EMERGENCY MEDICINE | Admitting: EMERGENCY MEDICINE
Payer: MEDICAID

## 2021-10-06 DIAGNOSIS — R41.82 AMS (ALTERED MENTAL STATUS): ICD-10-CM

## 2021-10-06 DIAGNOSIS — R94.31 QT PROLONGATION: ICD-10-CM

## 2021-10-06 DIAGNOSIS — Z03.6 ENCOUNTER FOR OBSERVATION FOR SUSPECTED TOXIC EFFECT FROM INGESTED SUBSTANCE: Primary | ICD-10-CM

## 2021-10-06 DIAGNOSIS — E87.29 HIGH ANION GAP METABOLIC ACIDOSIS: ICD-10-CM

## 2021-10-06 DIAGNOSIS — R07.9 CHEST PAIN: ICD-10-CM

## 2021-10-06 DIAGNOSIS — R74.8 HIGH SERUM OSMOLAR GAP: ICD-10-CM

## 2021-10-06 PROBLEM — F19.10 POLYSUBSTANCE ABUSE: Status: ACTIVE | Noted: 2021-10-06

## 2021-10-06 PROBLEM — G93.41 ACUTE METABOLIC ENCEPHALOPATHY: Status: ACTIVE | Noted: 2021-10-06

## 2021-10-06 PROBLEM — F10.10 ETOH ABUSE: Status: ACTIVE | Noted: 2021-10-06

## 2021-10-06 PROBLEM — I10 HTN (HYPERTENSION): Status: ACTIVE | Noted: 2021-10-06

## 2021-10-06 PROBLEM — R56.9 SEIZURES: Status: ACTIVE | Noted: 2021-10-06

## 2021-10-06 LAB
ALBUMIN SERPL BCP-MCNC: 4.3 G/DL (ref 3.5–5.2)
ALLENS TEST: ABNORMAL
ALP SERPL-CCNC: 77 U/L (ref 55–135)
ALT SERPL W/O P-5'-P-CCNC: 15 U/L (ref 10–44)
AMMONIA PLAS-SCNC: 33 UMOL/L (ref 10–50)
AMPHET+METHAMPHET UR QL: NEGATIVE
ANION GAP SERPL CALC-SCNC: 13 MMOL/L (ref 8–16)
ANION GAP SERPL CALC-SCNC: 18 MMOL/L (ref 8–16)
APAP SERPL-MCNC: <3 UG/ML (ref 10–20)
AST SERPL-CCNC: 24 U/L (ref 10–40)
BACTERIA #/AREA URNS AUTO: ABNORMAL /HPF
BARBITURATES UR QL SCN>200 NG/ML: NEGATIVE
BASOPHILS # BLD AUTO: 0.05 K/UL (ref 0–0.2)
BASOPHILS NFR BLD: 0.6 % (ref 0–1.9)
BENZODIAZ UR QL SCN>200 NG/ML: NEGATIVE
BILIRUB SERPL-MCNC: 0.7 MG/DL (ref 0.1–1)
BILIRUB UR QL STRIP: NEGATIVE
BUN SERPL-MCNC: 16 MG/DL (ref 6–20)
BUN SERPL-MCNC: 26 MG/DL (ref 6–20)
BZE UR QL SCN: NEGATIVE
CALCIUM SERPL-MCNC: 9.1 MG/DL (ref 8.7–10.5)
CALCIUM SERPL-MCNC: 9.4 MG/DL (ref 8.7–10.5)
CANNABINOIDS UR QL SCN: ABNORMAL
CHLORIDE SERPL-SCNC: 103 MMOL/L (ref 95–110)
CHLORIDE SERPL-SCNC: 103 MMOL/L (ref 95–110)
CLARITY UR REFRACT.AUTO: ABNORMAL
CO2 SERPL-SCNC: 16 MMOL/L (ref 23–29)
CO2 SERPL-SCNC: 21 MMOL/L (ref 23–29)
COLOR UR AUTO: YELLOW
CREAT SERPL-MCNC: 0.7 MG/DL (ref 0.5–1.4)
CREAT SERPL-MCNC: 0.8 MG/DL (ref 0.5–1.4)
CREAT UR-MCNC: 246 MG/DL (ref 23–375)
CTP QC/QA: YES
DIFFERENTIAL METHOD: ABNORMAL
EOSINOPHIL # BLD AUTO: 0.1 K/UL (ref 0–0.5)
EOSINOPHIL NFR BLD: 0.8 % (ref 0–8)
ERYTHROCYTE [DISTWIDTH] IN BLOOD BY AUTOMATED COUNT: 12.6 % (ref 11.5–14.5)
EST. GFR  (AFRICAN AMERICAN): >60 ML/MIN/1.73 M^2
EST. GFR  (AFRICAN AMERICAN): >60 ML/MIN/1.73 M^2
EST. GFR  (NON AFRICAN AMERICAN): >60 ML/MIN/1.73 M^2
EST. GFR  (NON AFRICAN AMERICAN): >60 ML/MIN/1.73 M^2
ETHANOL SERPL-MCNC: <10 MG/DL
ETHANOL UR-MCNC: <10 MG/DL
GLUCOSE SERPL-MCNC: 103 MG/DL (ref 70–110)
GLUCOSE SERPL-MCNC: 67 MG/DL (ref 70–110)
GLUCOSE UR QL STRIP: NEGATIVE
HCO3 UR-SCNC: 24.2 MMOL/L (ref 24–28)
HCT VFR BLD AUTO: 38.4 % (ref 40–54)
HCV AB SERPL QL IA: NEGATIVE
HGB BLD-MCNC: 12.3 G/DL (ref 14–18)
HGB UR QL STRIP: NEGATIVE
HIV 1+2 AB+HIV1 P24 AG SERPL QL IA: NEGATIVE
IMM GRANULOCYTES # BLD AUTO: 0.02 K/UL (ref 0–0.04)
IMM GRANULOCYTES NFR BLD AUTO: 0.2 % (ref 0–0.5)
KETONES UR QL STRIP: ABNORMAL
LACTATE SERPL-SCNC: 0.9 MMOL/L (ref 0.5–2.2)
LEUKOCYTE ESTERASE UR QL STRIP: ABNORMAL
LYMPHOCYTES # BLD AUTO: 2 K/UL (ref 1–4.8)
LYMPHOCYTES NFR BLD: 23.5 % (ref 18–48)
MCH RBC QN AUTO: 29.3 PG (ref 27–31)
MCHC RBC AUTO-ENTMCNC: 32 G/DL (ref 32–36)
MCV RBC AUTO: 91 FL (ref 82–98)
METHADONE UR QL SCN>300 NG/ML: NEGATIVE
MICROSCOPIC COMMENT: ABNORMAL
MONOCYTES # BLD AUTO: 0.6 K/UL (ref 0.3–1)
MONOCYTES NFR BLD: 6.6 % (ref 4–15)
NEUTROPHILS # BLD AUTO: 5.7 K/UL (ref 1.8–7.7)
NEUTROPHILS NFR BLD: 68.3 % (ref 38–73)
NITRITE UR QL STRIP: NEGATIVE
NRBC BLD-RTO: 0 /100 WBC
OPIATES UR QL SCN: NEGATIVE
OSMOLALITY SERPL: 304 MOSM/KG (ref 280–300)
PCO2 BLDA: 40.9 MMHG (ref 35–45)
PCP UR QL SCN>25 NG/ML: NEGATIVE
PH SMN: 7.38 [PH] (ref 7.35–7.45)
PH UR STRIP: 5 [PH] (ref 5–8)
PLATELET # BLD AUTO: 263 K/UL (ref 150–450)
PLATELET BLD QL SMEAR: ABNORMAL
PMV BLD AUTO: 9.4 FL (ref 9.2–12.9)
PO2 BLDA: 41 MMHG (ref 40–60)
POC BE: -1 MMOL/L
POC SATURATED O2: 75 % (ref 95–100)
POC TCO2: 25 MMOL/L (ref 24–29)
POCT GLUCOSE: 182 MG/DL (ref 70–110)
POTASSIUM SERPL-SCNC: 3.4 MMOL/L (ref 3.5–5.1)
POTASSIUM SERPL-SCNC: 4 MMOL/L (ref 3.5–5.1)
PROT SERPL-MCNC: 8.5 G/DL (ref 6–8.4)
PROT UR QL STRIP: NEGATIVE
RBC # BLD AUTO: 4.2 M/UL (ref 4.6–6.2)
RBC #/AREA URNS AUTO: 2 /HPF (ref 0–4)
SALICYLATES SERPL-MCNC: <5 MG/DL (ref 15–30)
SAMPLE: ABNORMAL
SARS-COV-2 RDRP RESP QL NAA+PROBE: NEGATIVE
SITE: ABNORMAL
SODIUM SERPL-SCNC: 137 MMOL/L (ref 136–145)
SODIUM SERPL-SCNC: 137 MMOL/L (ref 136–145)
SP GR UR STRIP: 1.02 (ref 1–1.03)
SQUAMOUS #/AREA URNS AUTO: 1 /HPF
TOXICOLOGY INFORMATION: ABNORMAL
TROPONIN I SERPL DL<=0.01 NG/ML-MCNC: <0.006 NG/ML (ref 0–0.03)
TSH SERPL DL<=0.005 MIU/L-ACNC: 1.36 UIU/ML (ref 0.4–4)
URATE SERPL-MCNC: 8.6 MG/DL (ref 3.4–7)
URN SPEC COLLECT METH UR: ABNORMAL
WBC # BLD AUTO: 8.38 K/UL (ref 3.9–12.7)
WBC #/AREA URNS AUTO: 20 /HPF (ref 0–5)

## 2021-10-06 PROCEDURE — G0378 HOSPITAL OBSERVATION PER HR: HCPCS

## 2021-10-06 PROCEDURE — 84550 ASSAY OF BLOOD/URIC ACID: CPT | Performed by: STUDENT IN AN ORGANIZED HEALTH CARE EDUCATION/TRAINING PROGRAM

## 2021-10-06 PROCEDURE — 81001 URINALYSIS AUTO W/SCOPE: CPT | Performed by: STUDENT IN AN ORGANIZED HEALTH CARE EDUCATION/TRAINING PROGRAM

## 2021-10-06 PROCEDURE — 83930 ASSAY OF BLOOD OSMOLALITY: CPT | Performed by: STUDENT IN AN ORGANIZED HEALTH CARE EDUCATION/TRAINING PROGRAM

## 2021-10-06 PROCEDURE — 25000003 PHARM REV CODE 250: Performed by: STUDENT IN AN ORGANIZED HEALTH CARE EDUCATION/TRAINING PROGRAM

## 2021-10-06 PROCEDURE — 93010 ELECTROCARDIOGRAM REPORT: CPT | Mod: ,,, | Performed by: INTERNAL MEDICINE

## 2021-10-06 PROCEDURE — 63600175 PHARM REV CODE 636 W HCPCS: Performed by: STUDENT IN AN ORGANIZED HEALTH CARE EDUCATION/TRAINING PROGRAM

## 2021-10-06 PROCEDURE — 80143 DRUG ASSAY ACETAMINOPHEN: CPT | Performed by: STUDENT IN AN ORGANIZED HEALTH CARE EDUCATION/TRAINING PROGRAM

## 2021-10-06 PROCEDURE — 87086 URINE CULTURE/COLONY COUNT: CPT | Performed by: STUDENT IN AN ORGANIZED HEALTH CARE EDUCATION/TRAINING PROGRAM

## 2021-10-06 PROCEDURE — 80053 COMPREHEN METABOLIC PANEL: CPT | Performed by: EMERGENCY MEDICINE

## 2021-10-06 PROCEDURE — 84443 ASSAY THYROID STIM HORMONE: CPT

## 2021-10-06 PROCEDURE — 82803 BLOOD GASES ANY COMBINATION: CPT

## 2021-10-06 PROCEDURE — 99219 PR INITIAL OBSERVATION CARE,LEVL II: ICD-10-PCS | Mod: ,,, | Performed by: STUDENT IN AN ORGANIZED HEALTH CARE EDUCATION/TRAINING PROGRAM

## 2021-10-06 PROCEDURE — 83605 ASSAY OF LACTIC ACID: CPT | Performed by: STUDENT IN AN ORGANIZED HEALTH CARE EDUCATION/TRAINING PROGRAM

## 2021-10-06 PROCEDURE — 85025 COMPLETE CBC W/AUTO DIFF WBC: CPT | Performed by: EMERGENCY MEDICINE

## 2021-10-06 PROCEDURE — 93005 ELECTROCARDIOGRAM TRACING: CPT

## 2021-10-06 PROCEDURE — 99291 PR CRITICAL CARE, E/M 30-74 MINUTES: ICD-10-PCS | Mod: CS,,, | Performed by: EMERGENCY MEDICINE

## 2021-10-06 PROCEDURE — 93010 EKG 12-LEAD: ICD-10-PCS | Mod: ,,, | Performed by: INTERNAL MEDICINE

## 2021-10-06 PROCEDURE — 82962 GLUCOSE BLOOD TEST: CPT

## 2021-10-06 PROCEDURE — 82077 ASSAY SPEC XCP UR&BREATH IA: CPT | Mod: 91 | Performed by: STUDENT IN AN ORGANIZED HEALTH CARE EDUCATION/TRAINING PROGRAM

## 2021-10-06 PROCEDURE — 99291 CRITICAL CARE FIRST HOUR: CPT | Mod: 25

## 2021-10-06 PROCEDURE — 80179 DRUG ASSAY SALICYLATE: CPT | Performed by: STUDENT IN AN ORGANIZED HEALTH CARE EDUCATION/TRAINING PROGRAM

## 2021-10-06 PROCEDURE — 96361 HYDRATE IV INFUSION ADD-ON: CPT

## 2021-10-06 PROCEDURE — 80320 DRUG SCREEN QUANTALCOHOLS: CPT | Performed by: STUDENT IN AN ORGANIZED HEALTH CARE EDUCATION/TRAINING PROGRAM

## 2021-10-06 PROCEDURE — 80048 BASIC METABOLIC PNL TOTAL CA: CPT | Performed by: STUDENT IN AN ORGANIZED HEALTH CARE EDUCATION/TRAINING PROGRAM

## 2021-10-06 PROCEDURE — U0002 COVID-19 LAB TEST NON-CDC: HCPCS | Performed by: STUDENT IN AN ORGANIZED HEALTH CARE EDUCATION/TRAINING PROGRAM

## 2021-10-06 PROCEDURE — 87040 BLOOD CULTURE FOR BACTERIA: CPT

## 2021-10-06 PROCEDURE — 84484 ASSAY OF TROPONIN QUANT: CPT

## 2021-10-06 PROCEDURE — 99219 PR INITIAL OBSERVATION CARE,LEVL II: CPT | Mod: ,,, | Performed by: STUDENT IN AN ORGANIZED HEALTH CARE EDUCATION/TRAINING PROGRAM

## 2021-10-06 PROCEDURE — 87389 HIV-1 AG W/HIV-1&-2 AB AG IA: CPT | Performed by: EMERGENCY MEDICINE

## 2021-10-06 PROCEDURE — 82140 ASSAY OF AMMONIA: CPT | Performed by: STUDENT IN AN ORGANIZED HEALTH CARE EDUCATION/TRAINING PROGRAM

## 2021-10-06 PROCEDURE — 99291 CRITICAL CARE FIRST HOUR: CPT | Mod: CS,,, | Performed by: EMERGENCY MEDICINE

## 2021-10-06 PROCEDURE — 99900035 HC TECH TIME PER 15 MIN (STAT)

## 2021-10-06 PROCEDURE — 96374 THER/PROPH/DIAG INJ IV PUSH: CPT

## 2021-10-06 PROCEDURE — 12000002 HC ACUTE/MED SURGE SEMI-PRIVATE ROOM

## 2021-10-06 PROCEDURE — 82077 ASSAY SPEC XCP UR&BREATH IA: CPT | Performed by: EMERGENCY MEDICINE

## 2021-10-06 PROCEDURE — 86592 SYPHILIS TEST NON-TREP QUAL: CPT

## 2021-10-06 PROCEDURE — 80307 DRUG TEST PRSMV CHEM ANLYZR: CPT | Performed by: STUDENT IN AN ORGANIZED HEALTH CARE EDUCATION/TRAINING PROGRAM

## 2021-10-06 PROCEDURE — 86803 HEPATITIS C AB TEST: CPT | Performed by: EMERGENCY MEDICINE

## 2021-10-06 RX ORDER — SODIUM CHLORIDE 0.9 % (FLUSH) 0.9 %
10 SYRINGE (ML) INJECTION EVERY 12 HOURS PRN
Status: DISCONTINUED | OUTPATIENT
Start: 2021-10-06 | End: 2021-10-10 | Stop reason: HOSPADM

## 2021-10-06 RX ORDER — ACETAMINOPHEN 325 MG/1
650 TABLET ORAL EVERY 6 HOURS PRN
Status: DISCONTINUED | OUTPATIENT
Start: 2021-10-06 | End: 2021-10-10 | Stop reason: HOSPADM

## 2021-10-06 RX ORDER — LISINOPRIL 20 MG/1
20 TABLET ORAL DAILY
Status: DISCONTINUED | OUTPATIENT
Start: 2021-10-07 | End: 2021-10-09

## 2021-10-06 RX ORDER — IBUPROFEN 200 MG
16 TABLET ORAL
Status: DISCONTINUED | OUTPATIENT
Start: 2021-10-06 | End: 2021-10-10 | Stop reason: HOSPADM

## 2021-10-06 RX ORDER — DEXTROSE 50 % IN WATER (D50W) INTRAVENOUS SYRINGE
25
Status: COMPLETED | OUTPATIENT
Start: 2021-10-06 | End: 2021-10-06

## 2021-10-06 RX ORDER — POTASSIUM CHLORIDE 750 MG/1
30 CAPSULE, EXTENDED RELEASE ORAL ONCE
Status: COMPLETED | OUTPATIENT
Start: 2021-10-06 | End: 2021-10-06

## 2021-10-06 RX ORDER — TALC
6 POWDER (GRAM) TOPICAL NIGHTLY PRN
Status: DISCONTINUED | OUTPATIENT
Start: 2021-10-06 | End: 2021-10-06 | Stop reason: SDUPTHER

## 2021-10-06 RX ORDER — GLUCAGON 1 MG
1 KIT INJECTION
Status: DISCONTINUED | OUTPATIENT
Start: 2021-10-06 | End: 2021-10-10 | Stop reason: HOSPADM

## 2021-10-06 RX ORDER — TALC
6 POWDER (GRAM) TOPICAL NIGHTLY PRN
Status: DISCONTINUED | OUTPATIENT
Start: 2021-10-06 | End: 2021-10-10 | Stop reason: HOSPADM

## 2021-10-06 RX ORDER — ENOXAPARIN SODIUM 100 MG/ML
40 INJECTION SUBCUTANEOUS EVERY 24 HOURS
Status: DISCONTINUED | OUTPATIENT
Start: 2021-10-06 | End: 2021-10-10 | Stop reason: HOSPADM

## 2021-10-06 RX ORDER — IBUPROFEN 200 MG
24 TABLET ORAL
Status: DISCONTINUED | OUTPATIENT
Start: 2021-10-06 | End: 2021-10-10 | Stop reason: HOSPADM

## 2021-10-06 RX ORDER — ACETAMINOPHEN 325 MG/1
650 TABLET ORAL
Status: COMPLETED | OUTPATIENT
Start: 2021-10-06 | End: 2021-10-06

## 2021-10-06 RX ORDER — SODIUM CHLORIDE 0.9 % (FLUSH) 0.9 %
10 SYRINGE (ML) INJECTION
Status: DISCONTINUED | OUTPATIENT
Start: 2021-10-06 | End: 2021-10-10 | Stop reason: HOSPADM

## 2021-10-06 RX ORDER — LEVETIRACETAM 500 MG/1
500 TABLET ORAL 2 TIMES DAILY
Status: DISCONTINUED | OUTPATIENT
Start: 2021-10-06 | End: 2021-10-10 | Stop reason: HOSPADM

## 2021-10-06 RX ADMIN — FOMEPIZOLE 950 MG: 1 INJECTION, SOLUTION INTRAVENOUS at 05:10

## 2021-10-06 RX ADMIN — THIAMINE HYDROCHLORIDE 100 MG: 100 INJECTION, SOLUTION INTRAMUSCULAR; INTRAVENOUS at 03:10

## 2021-10-06 RX ADMIN — THIAMINE HYDROCHLORIDE 500 MG: 100 INJECTION, SOLUTION INTRAMUSCULAR; INTRAVENOUS at 09:10

## 2021-10-06 RX ADMIN — LEVETIRACETAM 500 MG: 500 TABLET, FILM COATED ORAL at 10:10

## 2021-10-06 RX ADMIN — SODIUM CHLORIDE 1000 ML: 0.9 INJECTION, SOLUTION INTRAVENOUS at 03:10

## 2021-10-06 RX ADMIN — SODIUM CHLORIDE 1000 ML: 0.9 INJECTION, SOLUTION INTRAVENOUS at 01:10

## 2021-10-06 RX ADMIN — DEXTROSE MONOHYDRATE 25 G: 25 INJECTION, SOLUTION INTRAVENOUS at 01:10

## 2021-10-06 RX ADMIN — FOLIC ACID 5 MG: 5 INJECTION, SOLUTION INTRAMUSCULAR; INTRAVENOUS; SUBCUTANEOUS at 03:10

## 2021-10-06 RX ADMIN — POTASSIUM CHLORIDE 30 MEQ: 10 CAPSULE, COATED, EXTENDED RELEASE ORAL at 10:10

## 2021-10-06 RX ADMIN — ACETAMINOPHEN 650 MG: 325 TABLET ORAL at 10:10

## 2021-10-07 PROBLEM — R41.0 DELIRIUM: Status: ACTIVE | Noted: 2021-10-07

## 2021-10-07 PROBLEM — F19.959 SUBSTANCE-INDUCED PSYCHOTIC DISORDER: Status: ACTIVE | Noted: 2021-10-07

## 2021-10-07 LAB
ALBUMIN SERPL BCP-MCNC: 3.7 G/DL (ref 3.5–5.2)
ALP SERPL-CCNC: 70 U/L (ref 55–135)
ALT SERPL W/O P-5'-P-CCNC: 18 U/L (ref 10–44)
ANION GAP SERPL CALC-SCNC: 10 MMOL/L (ref 8–16)
ANION GAP SERPL CALC-SCNC: 12 MMOL/L (ref 8–16)
AST SERPL-CCNC: 26 U/L (ref 10–40)
BACTERIA UR CULT: NORMAL
BASOPHILS # BLD AUTO: 0.04 K/UL (ref 0–0.2)
BASOPHILS NFR BLD: 0.5 % (ref 0–1.9)
BILIRUB SERPL-MCNC: 0.6 MG/DL (ref 0.1–1)
BUN SERPL-MCNC: 13 MG/DL (ref 6–20)
BUN SERPL-MCNC: 14 MG/DL (ref 6–20)
CALCIUM SERPL-MCNC: 9.3 MG/DL (ref 8.7–10.5)
CALCIUM SERPL-MCNC: 9.3 MG/DL (ref 8.7–10.5)
CHLORIDE SERPL-SCNC: 106 MMOL/L (ref 95–110)
CHLORIDE SERPL-SCNC: 107 MMOL/L (ref 95–110)
CO2 SERPL-SCNC: 19 MMOL/L (ref 23–29)
CO2 SERPL-SCNC: 19 MMOL/L (ref 23–29)
CREAT SERPL-MCNC: 0.7 MG/DL (ref 0.5–1.4)
CREAT SERPL-MCNC: 0.7 MG/DL (ref 0.5–1.4)
DIFFERENTIAL METHOD: ABNORMAL
EOSINOPHIL # BLD AUTO: 0.1 K/UL (ref 0–0.5)
EOSINOPHIL NFR BLD: 0.7 % (ref 0–8)
ERYTHROCYTE [DISTWIDTH] IN BLOOD BY AUTOMATED COUNT: 12.7 % (ref 11.5–14.5)
EST. GFR  (AFRICAN AMERICAN): >60 ML/MIN/1.73 M^2
EST. GFR  (AFRICAN AMERICAN): >60 ML/MIN/1.73 M^2
EST. GFR  (NON AFRICAN AMERICAN): >60 ML/MIN/1.73 M^2
EST. GFR  (NON AFRICAN AMERICAN): >60 ML/MIN/1.73 M^2
GLUCOSE SERPL-MCNC: 85 MG/DL (ref 70–110)
GLUCOSE SERPL-MCNC: 92 MG/DL (ref 70–110)
HCT VFR BLD AUTO: 28.4 % (ref 40–54)
HGB BLD-MCNC: 9.2 G/DL (ref 14–18)
IMM GRANULOCYTES # BLD AUTO: 0.04 K/UL (ref 0–0.04)
IMM GRANULOCYTES NFR BLD AUTO: 0.5 % (ref 0–0.5)
LYMPHOCYTES # BLD AUTO: 2 K/UL (ref 1–4.8)
LYMPHOCYTES NFR BLD: 24.9 % (ref 18–48)
MAGNESIUM SERPL-MCNC: 1.9 MG/DL (ref 1.6–2.6)
MCH RBC QN AUTO: 29.9 PG (ref 27–31)
MCHC RBC AUTO-ENTMCNC: 32.4 G/DL (ref 32–36)
MCV RBC AUTO: 92 FL (ref 82–98)
MONOCYTES # BLD AUTO: 0.4 K/UL (ref 0.3–1)
MONOCYTES NFR BLD: 5.2 % (ref 4–15)
NEUTROPHILS # BLD AUTO: 5.5 K/UL (ref 1.8–7.7)
NEUTROPHILS NFR BLD: 68.2 % (ref 38–73)
NRBC BLD-RTO: 0 /100 WBC
PHOSPHATE SERPL-MCNC: 3.4 MG/DL (ref 2.7–4.5)
PLATELET # BLD AUTO: 227 K/UL (ref 150–450)
PMV BLD AUTO: 9 FL (ref 9.2–12.9)
POTASSIUM SERPL-SCNC: 4.2 MMOL/L (ref 3.5–5.1)
POTASSIUM SERPL-SCNC: 4.5 MMOL/L (ref 3.5–5.1)
PROT SERPL-MCNC: 7.5 G/DL (ref 6–8.4)
RBC # BLD AUTO: 3.08 M/UL (ref 4.6–6.2)
RPR SER QL: NORMAL
SODIUM SERPL-SCNC: 136 MMOL/L (ref 136–145)
SODIUM SERPL-SCNC: 137 MMOL/L (ref 136–145)
WBC # BLD AUTO: 8.03 K/UL (ref 3.9–12.7)

## 2021-10-07 PROCEDURE — G0378 HOSPITAL OBSERVATION PER HR: HCPCS

## 2021-10-07 PROCEDURE — 97161 PT EVAL LOW COMPLEX 20 MIN: CPT

## 2021-10-07 PROCEDURE — 84100 ASSAY OF PHOSPHORUS: CPT

## 2021-10-07 PROCEDURE — 25000003 PHARM REV CODE 250

## 2021-10-07 PROCEDURE — 80053 COMPREHEN METABOLIC PANEL: CPT

## 2021-10-07 PROCEDURE — 25000003 PHARM REV CODE 250: Performed by: STUDENT IN AN ORGANIZED HEALTH CARE EDUCATION/TRAINING PROGRAM

## 2021-10-07 PROCEDURE — 97165 OT EVAL LOW COMPLEX 30 MIN: CPT

## 2021-10-07 PROCEDURE — 83735 ASSAY OF MAGNESIUM: CPT

## 2021-10-07 PROCEDURE — 63600175 PHARM REV CODE 636 W HCPCS: Mod: JG | Performed by: STUDENT IN AN ORGANIZED HEALTH CARE EDUCATION/TRAINING PROGRAM

## 2021-10-07 PROCEDURE — 84425 ASSAY OF VITAMIN B-1: CPT | Performed by: EMERGENCY MEDICINE

## 2021-10-07 PROCEDURE — 80048 BASIC METABOLIC PNL TOTAL CA: CPT | Performed by: STUDENT IN AN ORGANIZED HEALTH CARE EDUCATION/TRAINING PROGRAM

## 2021-10-07 PROCEDURE — 11000001 HC ACUTE MED/SURG PRIVATE ROOM

## 2021-10-07 PROCEDURE — 90792 PSYCH DIAG EVAL W/MED SRVCS: CPT | Mod: ,,, | Performed by: PHYSICIAN ASSISTANT

## 2021-10-07 PROCEDURE — 99225 PR SUBSEQUENT OBSERVATION CARE,LEVEL II: ICD-10-PCS | Mod: ,,, | Performed by: STUDENT IN AN ORGANIZED HEALTH CARE EDUCATION/TRAINING PROGRAM

## 2021-10-07 PROCEDURE — 63600175 PHARM REV CODE 636 W HCPCS: Performed by: STUDENT IN AN ORGANIZED HEALTH CARE EDUCATION/TRAINING PROGRAM

## 2021-10-07 PROCEDURE — 85025 COMPLETE CBC W/AUTO DIFF WBC: CPT

## 2021-10-07 PROCEDURE — 97530 THERAPEUTIC ACTIVITIES: CPT

## 2021-10-07 PROCEDURE — 99225 PR SUBSEQUENT OBSERVATION CARE,LEVEL II: CPT | Mod: ,,, | Performed by: STUDENT IN AN ORGANIZED HEALTH CARE EDUCATION/TRAINING PROGRAM

## 2021-10-07 PROCEDURE — 90792 PR PSYCHIATRIC DIAGNOSTIC EVALUATION W/MEDICAL SERVICES: ICD-10-PCS | Mod: ,,, | Performed by: PHYSICIAN ASSISTANT

## 2021-10-07 PROCEDURE — 36415 COLL VENOUS BLD VENIPUNCTURE: CPT | Performed by: STUDENT IN AN ORGANIZED HEALTH CARE EDUCATION/TRAINING PROGRAM

## 2021-10-07 RX ORDER — RISPERIDONE 1 MG/1
1 TABLET ORAL NIGHTLY
Status: DISCONTINUED | OUTPATIENT
Start: 2021-10-07 | End: 2021-10-09

## 2021-10-07 RX ORDER — OLANZAPINE 2.5 MG/1
5 TABLET ORAL EVERY 6 HOURS PRN
Status: DISCONTINUED | OUTPATIENT
Start: 2021-10-07 | End: 2021-10-10 | Stop reason: HOSPADM

## 2021-10-07 RX ORDER — OLANZAPINE 2.5 MG/1
10 TABLET ORAL NIGHTLY PRN
Status: DISCONTINUED | OUTPATIENT
Start: 2021-10-07 | End: 2021-10-07

## 2021-10-07 RX ORDER — FOLIC ACID 1 MG/1
1 TABLET ORAL DAILY
Status: DISCONTINUED | OUTPATIENT
Start: 2021-10-07 | End: 2021-10-10 | Stop reason: HOSPADM

## 2021-10-07 RX ORDER — OLANZAPINE 10 MG/2ML
5 INJECTION, POWDER, FOR SOLUTION INTRAMUSCULAR ONCE AS NEEDED
Status: DISCONTINUED | OUTPATIENT
Start: 2021-10-07 | End: 2021-10-07

## 2021-10-07 RX ADMIN — THIAMINE HYDROCHLORIDE 500 MG: 100 INJECTION, SOLUTION INTRAMUSCULAR; INTRAVENOUS at 04:10

## 2021-10-07 RX ADMIN — LISINOPRIL 20 MG: 20 TABLET ORAL at 08:10

## 2021-10-07 RX ADMIN — RISPERIDONE 1 MG: 1 TABLET ORAL at 09:10

## 2021-10-07 RX ADMIN — FOMEPIZOLE 640 MG: 1 INJECTION, SOLUTION INTRAVENOUS at 01:10

## 2021-10-07 RX ADMIN — FOLIC ACID 1 MG: 1 TABLET ORAL at 12:10

## 2021-10-07 RX ADMIN — ENOXAPARIN SODIUM 40 MG: 40 INJECTION, SOLUTION INTRAVENOUS; SUBCUTANEOUS at 05:10

## 2021-10-07 RX ADMIN — LEVETIRACETAM 500 MG: 500 TABLET, FILM COATED ORAL at 09:10

## 2021-10-07 RX ADMIN — LEVETIRACETAM 500 MG: 500 TABLET, FILM COATED ORAL at 08:10

## 2021-10-08 PROBLEM — Z03.6: Status: ACTIVE | Noted: 2021-10-08

## 2021-10-08 LAB
ETHYLENE GLYCOL SERPLBLD-MCNC: NOT DETECTED MG/DL
METHANOL SERPL-MCNC: <5 MG/DL

## 2021-10-08 PROCEDURE — 25000003 PHARM REV CODE 250: Performed by: STUDENT IN AN ORGANIZED HEALTH CARE EDUCATION/TRAINING PROGRAM

## 2021-10-08 PROCEDURE — 25000003 PHARM REV CODE 250

## 2021-10-08 PROCEDURE — 99232 PR SUBSEQUENT HOSPITAL CARE,LEVL II: ICD-10-PCS | Mod: ,,, | Performed by: STUDENT IN AN ORGANIZED HEALTH CARE EDUCATION/TRAINING PROGRAM

## 2021-10-08 PROCEDURE — 63600175 PHARM REV CODE 636 W HCPCS: Mod: JG | Performed by: STUDENT IN AN ORGANIZED HEALTH CARE EDUCATION/TRAINING PROGRAM

## 2021-10-08 PROCEDURE — 99232 SBSQ HOSP IP/OBS MODERATE 35: CPT | Mod: ,,, | Performed by: STUDENT IN AN ORGANIZED HEALTH CARE EDUCATION/TRAINING PROGRAM

## 2021-10-08 PROCEDURE — 11000001 HC ACUTE MED/SURG PRIVATE ROOM

## 2021-10-08 RX ORDER — CALCIUM CARBONATE 200(500)MG
500 TABLET,CHEWABLE ORAL 3 TIMES DAILY PRN
Status: DISCONTINUED | OUTPATIENT
Start: 2021-10-08 | End: 2021-10-10 | Stop reason: HOSPADM

## 2021-10-08 RX ORDER — PANTOPRAZOLE SODIUM 20 MG/1
20 TABLET, DELAYED RELEASE ORAL DAILY
Status: DISCONTINUED | OUTPATIENT
Start: 2021-10-08 | End: 2021-10-10 | Stop reason: HOSPADM

## 2021-10-08 RX ORDER — CYANOCOBALAMIN (VITAMIN B-12) 250 MCG
250 TABLET ORAL DAILY
Status: DISCONTINUED | OUTPATIENT
Start: 2021-10-08 | End: 2021-10-10 | Stop reason: HOSPADM

## 2021-10-08 RX ADMIN — LEVETIRACETAM 500 MG: 500 TABLET, FILM COATED ORAL at 08:10

## 2021-10-08 RX ADMIN — PANTOPRAZOLE SODIUM 20 MG: 20 TABLET, DELAYED RELEASE ORAL at 10:10

## 2021-10-08 RX ADMIN — CYANOCOBALAMIN TAB 250 MCG 250 MCG: 250 TAB at 11:10

## 2021-10-08 RX ADMIN — CALCIUM CARBONATE (ANTACID) CHEW TAB 500 MG 500 MG: 500 CHEW TAB at 10:10

## 2021-10-08 RX ADMIN — Medication 6 MG: at 08:10

## 2021-10-08 RX ADMIN — FOMEPIZOLE 950 MG: 1 INJECTION, SOLUTION INTRAVENOUS at 11:10

## 2021-10-08 RX ADMIN — FOLIC ACID 1 MG: 1 TABLET ORAL at 08:10

## 2021-10-08 RX ADMIN — FOMEPIZOLE 640 MG: 1 INJECTION, SOLUTION INTRAVENOUS at 11:10

## 2021-10-08 RX ADMIN — THIAMINE HYDROCHLORIDE 500 MG: 100 INJECTION, SOLUTION INTRAMUSCULAR; INTRAVENOUS at 08:10

## 2021-10-08 RX ADMIN — RISPERIDONE 1 MG: 1 TABLET ORAL at 08:10

## 2021-10-09 PROBLEM — R74.8 ELEVATED LIVER ENZYMES: Status: ACTIVE | Noted: 2021-10-09

## 2021-10-09 LAB
ALBUMIN SERPL BCP-MCNC: 3.2 G/DL (ref 3.5–5.2)
ALP SERPL-CCNC: 202 U/L (ref 55–135)
ALT SERPL W/O P-5'-P-CCNC: 230 U/L (ref 10–44)
ANION GAP SERPL CALC-SCNC: 10 MMOL/L (ref 8–16)
AST SERPL-CCNC: 507 U/L (ref 10–40)
BASOPHILS # BLD AUTO: 0.02 K/UL (ref 0–0.2)
BASOPHILS NFR BLD: 0.3 % (ref 0–1.9)
BILIRUB SERPL-MCNC: 0.3 MG/DL (ref 0.1–1)
BUN SERPL-MCNC: 12 MG/DL (ref 6–20)
CALCIUM SERPL-MCNC: 9 MG/DL (ref 8.7–10.5)
CHLORIDE SERPL-SCNC: 104 MMOL/L (ref 95–110)
CO2 SERPL-SCNC: 20 MMOL/L (ref 23–29)
CREAT SERPL-MCNC: 0.6 MG/DL (ref 0.5–1.4)
DIFFERENTIAL METHOD: ABNORMAL
EOSINOPHIL # BLD AUTO: 0.1 K/UL (ref 0–0.5)
EOSINOPHIL NFR BLD: 1.1 % (ref 0–8)
ERYTHROCYTE [DISTWIDTH] IN BLOOD BY AUTOMATED COUNT: 12.5 % (ref 11.5–14.5)
EST. GFR  (AFRICAN AMERICAN): >60 ML/MIN/1.73 M^2
EST. GFR  (NON AFRICAN AMERICAN): >60 ML/MIN/1.73 M^2
GLUCOSE SERPL-MCNC: 90 MG/DL (ref 70–110)
HCT VFR BLD AUTO: 30.7 % (ref 40–54)
HGB BLD-MCNC: 9.7 G/DL (ref 14–18)
IMM GRANULOCYTES # BLD AUTO: 0.03 K/UL (ref 0–0.04)
IMM GRANULOCYTES NFR BLD AUTO: 0.4 % (ref 0–0.5)
LYMPHOCYTES # BLD AUTO: 1.9 K/UL (ref 1–4.8)
LYMPHOCYTES NFR BLD: 27.2 % (ref 18–48)
MAGNESIUM SERPL-MCNC: 1.8 MG/DL (ref 1.6–2.6)
MCH RBC QN AUTO: 28.9 PG (ref 27–31)
MCHC RBC AUTO-ENTMCNC: 31.6 G/DL (ref 32–36)
MCV RBC AUTO: 91 FL (ref 82–98)
MONOCYTES # BLD AUTO: 0.4 K/UL (ref 0.3–1)
MONOCYTES NFR BLD: 5.8 % (ref 4–15)
NEUTROPHILS # BLD AUTO: 4.6 K/UL (ref 1.8–7.7)
NEUTROPHILS NFR BLD: 65.2 % (ref 38–73)
NRBC BLD-RTO: 0 /100 WBC
PHOSPHATE SERPL-MCNC: 4.2 MG/DL (ref 2.7–4.5)
PLATELET # BLD AUTO: 272 K/UL (ref 150–450)
PMV BLD AUTO: 9.3 FL (ref 9.2–12.9)
POTASSIUM SERPL-SCNC: 3.4 MMOL/L (ref 3.5–5.1)
PROT SERPL-MCNC: 6.5 G/DL (ref 6–8.4)
RBC # BLD AUTO: 3.36 M/UL (ref 4.6–6.2)
SODIUM SERPL-SCNC: 134 MMOL/L (ref 136–145)
WBC # BLD AUTO: 7.1 K/UL (ref 3.9–12.7)

## 2021-10-09 PROCEDURE — 83735 ASSAY OF MAGNESIUM: CPT

## 2021-10-09 PROCEDURE — 99232 SBSQ HOSP IP/OBS MODERATE 35: CPT | Mod: ,,, | Performed by: PSYCHIATRY & NEUROLOGY

## 2021-10-09 PROCEDURE — 25000003 PHARM REV CODE 250: Performed by: STUDENT IN AN ORGANIZED HEALTH CARE EDUCATION/TRAINING PROGRAM

## 2021-10-09 PROCEDURE — 11000001 HC ACUTE MED/SURG PRIVATE ROOM

## 2021-10-09 PROCEDURE — 99232 SBSQ HOSP IP/OBS MODERATE 35: CPT | Mod: ,,, | Performed by: HOSPITALIST

## 2021-10-09 PROCEDURE — 80053 COMPREHEN METABOLIC PANEL: CPT

## 2021-10-09 PROCEDURE — 84100 ASSAY OF PHOSPHORUS: CPT

## 2021-10-09 PROCEDURE — 85025 COMPLETE CBC W/AUTO DIFF WBC: CPT

## 2021-10-09 PROCEDURE — 99232 PR SUBSEQUENT HOSPITAL CARE,LEVL II: ICD-10-PCS | Mod: ,,, | Performed by: HOSPITALIST

## 2021-10-09 PROCEDURE — 99232 PR SUBSEQUENT HOSPITAL CARE,LEVL II: ICD-10-PCS | Mod: ,,, | Performed by: PSYCHIATRY & NEUROLOGY

## 2021-10-09 PROCEDURE — 25000003 PHARM REV CODE 250

## 2021-10-09 PROCEDURE — 63600175 PHARM REV CODE 636 W HCPCS: Performed by: STUDENT IN AN ORGANIZED HEALTH CARE EDUCATION/TRAINING PROGRAM

## 2021-10-09 PROCEDURE — 36415 COLL VENOUS BLD VENIPUNCTURE: CPT

## 2021-10-09 RX ORDER — POTASSIUM CHLORIDE 20 MEQ/1
60 TABLET, EXTENDED RELEASE ORAL ONCE
Status: COMPLETED | OUTPATIENT
Start: 2021-10-09 | End: 2021-10-09

## 2021-10-09 RX ADMIN — PANTOPRAZOLE SODIUM 20 MG: 20 TABLET, DELAYED RELEASE ORAL at 09:10

## 2021-10-09 RX ADMIN — LEVETIRACETAM 500 MG: 500 TABLET, FILM COATED ORAL at 09:10

## 2021-10-09 RX ADMIN — THIAMINE HYDROCHLORIDE 250 MG: 100 INJECTION, SOLUTION INTRAMUSCULAR; INTRAVENOUS at 09:10

## 2021-10-09 RX ADMIN — LEVETIRACETAM 500 MG: 500 TABLET, FILM COATED ORAL at 08:10

## 2021-10-09 RX ADMIN — CALCIUM CARBONATE (ANTACID) CHEW TAB 500 MG 500 MG: 500 CHEW TAB at 08:10

## 2021-10-09 RX ADMIN — CYANOCOBALAMIN TAB 250 MCG 250 MCG: 250 TAB at 09:10

## 2021-10-09 RX ADMIN — POTASSIUM CHLORIDE 60 MEQ: 1500 TABLET, EXTENDED RELEASE ORAL at 09:10

## 2021-10-09 RX ADMIN — FOLIC ACID 1 MG: 1 TABLET ORAL at 09:10

## 2021-10-09 RX ADMIN — LISINOPRIL 20 MG: 20 TABLET ORAL at 09:10

## 2021-10-10 VITALS
RESPIRATION RATE: 16 BRPM | HEART RATE: 76 BPM | TEMPERATURE: 98 F | SYSTOLIC BLOOD PRESSURE: 128 MMHG | WEIGHT: 140 LBS | OXYGEN SATURATION: 99 % | HEIGHT: 67 IN | BODY MASS INDEX: 21.97 KG/M2 | DIASTOLIC BLOOD PRESSURE: 84 MMHG

## 2021-10-10 LAB
ALBUMIN SERPL BCP-MCNC: 3.4 G/DL (ref 3.5–5.2)
ALP SERPL-CCNC: 166 U/L (ref 55–135)
ALT SERPL W/O P-5'-P-CCNC: 127 U/L (ref 10–44)
ANION GAP SERPL CALC-SCNC: 11 MMOL/L (ref 8–16)
AST SERPL-CCNC: 108 U/L (ref 10–40)
BASOPHILS # BLD AUTO: 0.06 K/UL (ref 0–0.2)
BASOPHILS NFR BLD: 0.8 % (ref 0–1.9)
BILIRUB SERPL-MCNC: 0.3 MG/DL (ref 0.1–1)
BUN SERPL-MCNC: 11 MG/DL (ref 6–20)
CALCIUM SERPL-MCNC: 9.5 MG/DL (ref 8.7–10.5)
CHLORIDE SERPL-SCNC: 104 MMOL/L (ref 95–110)
CO2 SERPL-SCNC: 20 MMOL/L (ref 23–29)
CREAT SERPL-MCNC: 0.7 MG/DL (ref 0.5–1.4)
DIFFERENTIAL METHOD: ABNORMAL
EOSINOPHIL # BLD AUTO: 0.1 K/UL (ref 0–0.5)
EOSINOPHIL NFR BLD: 1.3 % (ref 0–8)
ERYTHROCYTE [DISTWIDTH] IN BLOOD BY AUTOMATED COUNT: 12.5 % (ref 11.5–14.5)
EST. GFR  (AFRICAN AMERICAN): >60 ML/MIN/1.73 M^2
EST. GFR  (NON AFRICAN AMERICAN): >60 ML/MIN/1.73 M^2
GLUCOSE SERPL-MCNC: 84 MG/DL (ref 70–110)
HCT VFR BLD AUTO: 33.4 % (ref 40–54)
HGB BLD-MCNC: 10.3 G/DL (ref 14–18)
IMM GRANULOCYTES # BLD AUTO: 0.03 K/UL (ref 0–0.04)
IMM GRANULOCYTES NFR BLD AUTO: 0.4 % (ref 0–0.5)
INR PPP: 1 (ref 0.8–1.2)
LYMPHOCYTES # BLD AUTO: 2.5 K/UL (ref 1–4.8)
LYMPHOCYTES NFR BLD: 32.6 % (ref 18–48)
MAGNESIUM SERPL-MCNC: 1.7 MG/DL (ref 1.6–2.6)
MCH RBC QN AUTO: 29.1 PG (ref 27–31)
MCHC RBC AUTO-ENTMCNC: 30.8 G/DL (ref 32–36)
MCV RBC AUTO: 94 FL (ref 82–98)
MONOCYTES # BLD AUTO: 0.4 K/UL (ref 0.3–1)
MONOCYTES NFR BLD: 5.7 % (ref 4–15)
NEUTROPHILS # BLD AUTO: 4.6 K/UL (ref 1.8–7.7)
NEUTROPHILS NFR BLD: 59.2 % (ref 38–73)
NRBC BLD-RTO: 0 /100 WBC
PHOSPHATE SERPL-MCNC: 4 MG/DL (ref 2.7–4.5)
PLATELET # BLD AUTO: 276 K/UL (ref 150–450)
PMV BLD AUTO: 9.7 FL (ref 9.2–12.9)
POTASSIUM SERPL-SCNC: 3.8 MMOL/L (ref 3.5–5.1)
PROT SERPL-MCNC: 6.9 G/DL (ref 6–8.4)
PROTHROMBIN TIME: 10.8 SEC (ref 9–12.5)
RBC # BLD AUTO: 3.54 M/UL (ref 4.6–6.2)
SODIUM SERPL-SCNC: 135 MMOL/L (ref 136–145)
WBC # BLD AUTO: 7.74 K/UL (ref 3.9–12.7)

## 2021-10-10 PROCEDURE — 36415 COLL VENOUS BLD VENIPUNCTURE: CPT

## 2021-10-10 PROCEDURE — 93010 ELECTROCARDIOGRAM REPORT: CPT | Mod: ,,, | Performed by: INTERNAL MEDICINE

## 2021-10-10 PROCEDURE — 85025 COMPLETE CBC W/AUTO DIFF WBC: CPT

## 2021-10-10 PROCEDURE — 85610 PROTHROMBIN TIME: CPT | Performed by: HOSPITALIST

## 2021-10-10 PROCEDURE — 83735 ASSAY OF MAGNESIUM: CPT

## 2021-10-10 PROCEDURE — 99238 PR HOSPITAL DISCHARGE DAY,<30 MIN: ICD-10-PCS | Mod: ,,, | Performed by: HOSPITALIST

## 2021-10-10 PROCEDURE — 93005 ELECTROCARDIOGRAM TRACING: CPT

## 2021-10-10 PROCEDURE — 25000003 PHARM REV CODE 250

## 2021-10-10 PROCEDURE — 99238 HOSP IP/OBS DSCHRG MGMT 30/<: CPT | Mod: ,,, | Performed by: HOSPITALIST

## 2021-10-10 PROCEDURE — 63600175 PHARM REV CODE 636 W HCPCS: Performed by: STUDENT IN AN ORGANIZED HEALTH CARE EDUCATION/TRAINING PROGRAM

## 2021-10-10 PROCEDURE — 25000003 PHARM REV CODE 250: Performed by: STUDENT IN AN ORGANIZED HEALTH CARE EDUCATION/TRAINING PROGRAM

## 2021-10-10 PROCEDURE — 80053 COMPREHEN METABOLIC PANEL: CPT

## 2021-10-10 PROCEDURE — 93010 EKG 12-LEAD: ICD-10-PCS | Mod: ,,, | Performed by: INTERNAL MEDICINE

## 2021-10-10 PROCEDURE — 84100 ASSAY OF PHOSPHORUS: CPT

## 2021-10-10 RX ORDER — HALOPERIDOL 5 MG/1
5 TABLET ORAL NIGHTLY
Status: DISCONTINUED | OUTPATIENT
Start: 2021-10-10 | End: 2021-10-10 | Stop reason: HOSPADM

## 2021-10-10 RX ORDER — HALOPERIDOL 5 MG/1
5 TABLET ORAL NIGHTLY
Qty: 30 TABLET | Refills: 11 | Status: SHIPPED | OUTPATIENT
Start: 2021-10-10 | End: 2022-10-10

## 2021-10-10 RX ORDER — FOLIC ACID 1 MG/1
1 TABLET ORAL DAILY
Refills: 0
Start: 2021-10-11 | End: 2022-10-11

## 2021-10-10 RX ORDER — CYANOCOBALAMIN (VITAMIN B-12) 250 MCG
250 TABLET ORAL DAILY
Qty: 30 TABLET | Refills: 0
Start: 2021-10-11

## 2021-10-10 RX ORDER — LEVETIRACETAM 500 MG/1
500 TABLET ORAL 2 TIMES DAILY
Qty: 60 TABLET | Refills: 11 | Status: SHIPPED | OUTPATIENT
Start: 2021-10-10 | End: 2022-10-10

## 2021-10-11 LAB
BACTERIA BLD CULT: NORMAL
BACTERIA BLD CULT: NORMAL

## 2021-10-12 LAB — VIT B1 BLD-MCNC: 106 UG/L (ref 38–122)
